# Patient Record
Sex: FEMALE | Race: ASIAN | NOT HISPANIC OR LATINO | Employment: FULL TIME | URBAN - METROPOLITAN AREA
[De-identification: names, ages, dates, MRNs, and addresses within clinical notes are randomized per-mention and may not be internally consistent; named-entity substitution may affect disease eponyms.]

---

## 2017-01-23 ENCOUNTER — LAB REQUISITION (OUTPATIENT)
Dept: LAB | Facility: HOSPITAL | Age: 37
End: 2017-01-23
Payer: COMMERCIAL

## 2017-01-23 ENCOUNTER — ALLSCRIPTS OFFICE VISIT (OUTPATIENT)
Dept: OTHER | Facility: OTHER | Age: 37
End: 2017-01-23

## 2017-01-23 DIAGNOSIS — Z11.3 ENCOUNTER FOR SCREENING FOR INFECTIONS WITH PREDOMINANTLY SEXUAL MODE OF TRANSMISSION: ICD-10-CM

## 2017-01-23 DIAGNOSIS — Z12.9 ENCOUNTER FOR SCREENING FOR MALIGNANT NEOPLASM: ICD-10-CM

## 2017-01-23 DIAGNOSIS — Z13.220 ENCOUNTER FOR SCREENING FOR LIPOID DISORDERS: ICD-10-CM

## 2017-01-23 PROCEDURE — 87591 N.GONORRHOEAE DNA AMP PROB: CPT | Performed by: FAMILY MEDICINE

## 2017-01-23 PROCEDURE — 87147 CULTURE TYPE IMMUNOLOGIC: CPT | Performed by: FAMILY MEDICINE

## 2017-01-23 PROCEDURE — 87491 CHLMYD TRACH DNA AMP PROBE: CPT

## 2017-01-23 PROCEDURE — 87070 CULTURE OTHR SPECIMN AEROBIC: CPT | Performed by: FAMILY MEDICINE

## 2017-01-23 PROCEDURE — G0145 SCR C/V CYTO,THINLAYER,RESCR: HCPCS | Performed by: FAMILY MEDICINE

## 2017-01-23 PROCEDURE — 87624 HPV HI-RISK TYP POOLED RSLT: CPT | Performed by: FAMILY MEDICINE

## 2017-01-24 DIAGNOSIS — Z13.220 ENCOUNTER FOR SCREENING FOR LIPOID DISORDERS: Primary | ICD-10-CM

## 2017-01-26 LAB
HPV RRNA GENITAL QL NAA+PROBE: NORMAL
LAB AP GYN PRIMARY INTERPRETATION: NORMAL
Lab: NORMAL

## 2017-01-27 LAB
BACTERIA GENITAL AEROBE CULT: NORMAL

## 2017-01-31 LAB — MISCELLANEOUS LAB TEST RESULT: NORMAL

## 2017-02-02 ENCOUNTER — GENERIC CONVERSION - ENCOUNTER (OUTPATIENT)
Dept: OTHER | Facility: OTHER | Age: 37
End: 2017-02-02

## 2018-01-13 VITALS
SYSTOLIC BLOOD PRESSURE: 100 MMHG | HEART RATE: 74 BPM | HEIGHT: 64 IN | RESPIRATION RATE: 18 BRPM | BODY MASS INDEX: 33.47 KG/M2 | OXYGEN SATURATION: 95 % | WEIGHT: 196.03 LBS | DIASTOLIC BLOOD PRESSURE: 60 MMHG

## 2018-01-13 NOTE — RESULT NOTES
Message   Called the patient but she did not   Left voicemail saying that her Pap smear is normal and genital culture showed growing normal vaginal kyler  Verified Results  (1) GENITAL COMPREHENSIVE CULTURE 23Jan2017 12:00PM Mario Jailyn     Test Name Result Flag Reference   CLINICAL REPORT (Report)     Test:        Genital Comprehensive Culture  Specimen Source:  Vaginal  Specimen Type:   Genital  Specimen Date:   1/23/2017 12:00 PM  Result Date:    1/27/2017 1:46 PM  Result Status:   Final result  Resulting Lab:   [place] [address]            Tel: [phone]      CULTURE                                       ------------------                                   1+ Growth of Beta Hemolytic Streptococcus Group B     *** This organism is intrinisically susceptible to Penicillin  If sensitivites to other antibiotics are required, please call the      Microbiology Department at [phone] within 5 days   ***    3+ Growth of Gardnerella vaginalis    3+ Growth of Mixed Vaginal Kyler     (1) SPECIAL TEST 23Jan2017 08:13AM Peter Cali     Test Name Result Flag Reference   TEST RESULT      SEE WRITTEN REPORT FROM [facility]

## 2018-02-15 ENCOUNTER — OFFICE VISIT (OUTPATIENT)
Dept: FAMILY MEDICINE CLINIC | Facility: CLINIC | Age: 38
End: 2018-02-15
Payer: COMMERCIAL

## 2018-02-15 VITALS
TEMPERATURE: 99.1 F | DIASTOLIC BLOOD PRESSURE: 86 MMHG | BODY MASS INDEX: 35.36 KG/M2 | WEIGHT: 207.13 LBS | OXYGEN SATURATION: 98 % | HEART RATE: 91 BPM | SYSTOLIC BLOOD PRESSURE: 124 MMHG | HEIGHT: 64 IN | RESPIRATION RATE: 18 BRPM

## 2018-02-15 DIAGNOSIS — Z12.39 ENCOUNTER FOR SCREENING FOR MALIGNANT NEOPLASM OF BREAST: ICD-10-CM

## 2018-02-15 DIAGNOSIS — Z80.3 FH: BREAST CANCER IN FIRST DEGREE RELATIVE: Primary | ICD-10-CM

## 2018-02-15 DIAGNOSIS — Z20.1 EXPOSURE TO TB: ICD-10-CM

## 2018-02-15 PROCEDURE — 99213 OFFICE O/P EST LOW 20 MIN: CPT | Performed by: FAMILY MEDICINE

## 2018-02-15 PROCEDURE — 3008F BODY MASS INDEX DOCD: CPT | Performed by: FAMILY MEDICINE

## 2018-02-15 NOTE — PROGRESS NOTES
Assessment/Plan:    Will get quantiferon gold test  And order mammo  Instructions  Monitor for sx     Diagnoses and all orders for this visit:    FH: breast cancer in first degree relative  -     Mammo screening bilateral w 3d & cad; Future    Exposure to TB  -     Quantiferon TB Gold; Future          Subjective:      Patient ID: Camden Daigle is a 44 y o  female  Patient was exposed to active tb  Over at least 8 weeks ago  Was rec to get quantiferon test  She has no sx  Otherwise healthy needs mammo as mother recently dx d with breast ca        The following portions of the patient's history were reviewed and updated as appropriate: past family history, past medical history, past social history and past surgical history  Review of Systems   Constitutional: Negative  HENT: Negative  Respiratory:        See hpi   Cardiovascular: Negative  Gastrointestinal: Negative  Musculoskeletal: Negative  Skin: Negative  Psychiatric/Behavioral: Negative  Objective:    Vitals:    02/15/18 0915   BP: 124/86   Pulse: 91   Resp: 18   Temp: 99 1 °F (37 3 °C)   SpO2: 98%        Physical Exam   Constitutional: She is oriented to person, place, and time  She appears well-developed  HENT:   Head: Normocephalic and atraumatic  Eyes: Pupils are equal, round, and reactive to light  Neck: Normal range of motion  Cardiovascular: Normal rate, regular rhythm and normal heart sounds  Pulmonary/Chest: Effort normal and breath sounds normal    Abdominal: Soft  Neurological: She is alert and oriented to person, place, and time  Skin: Skin is warm  Psychiatric: She has a normal mood and affect   Her behavior is normal

## 2018-02-17 LAB
ANNOTATION COMMENT IMP: NORMAL
GAMMA INTERFERON BACKGROUND BLD IA-ACNC: 0.02 IU/ML
M TB IFN-G BLD-IMP: NEGATIVE
M TB IFN-G CD4+ BCKGRND COR BLD-ACNC: 0 IU/ML
M TB IFN-G CD4+ T-CELLS BLD-ACNC: 0.02 IU/ML
MITOGEN IGNF BLD-ACNC: 5.45 IU/ML
QUANTIFERON-TB GOLD IN TUBE: NORMAL
SERVICE CMNT-IMP: NORMAL

## 2018-02-21 ENCOUNTER — HOSPITAL ENCOUNTER (OUTPATIENT)
Dept: RADIOLOGY | Facility: HOSPITAL | Age: 38
Discharge: HOME | End: 2018-02-21
Attending: FAMILY MEDICINE
Payer: COMMERCIAL

## 2018-02-21 DIAGNOSIS — Z80.3 FH: BREAST CANCER IN FIRST DEGREE RELATIVE: ICD-10-CM

## 2018-02-21 PROCEDURE — 77067 SCR MAMMO BI INCL CAD: CPT

## 2018-02-21 PROCEDURE — 77063 BREAST TOMOSYNTHESIS BI: CPT

## 2018-02-28 ENCOUNTER — HOSPITAL ENCOUNTER (OUTPATIENT)
Dept: RADIOLOGY | Facility: HOSPITAL | Age: 38
Discharge: HOME | End: 2018-02-28
Attending: FAMILY MEDICINE
Payer: COMMERCIAL

## 2018-02-28 ENCOUNTER — TRANSCRIBE ORDERS (OUTPATIENT)
Dept: ADMINISTRATIVE | Facility: HOSPITAL | Age: 38
End: 2018-02-28

## 2018-02-28 DIAGNOSIS — R92.8 ABNORMAL SCREENING MAMMOGRAM: ICD-10-CM

## 2018-02-28 PROCEDURE — G0279 TOMOSYNTHESIS, MAMMO: HCPCS

## 2018-02-28 PROCEDURE — 76642 ULTRASOUND BREAST LIMITED: CPT

## 2018-02-28 PROCEDURE — 77065 DX MAMMO INCL CAD UNI: CPT

## 2018-07-20 ENCOUNTER — OFFICE VISIT (OUTPATIENT)
Dept: FAMILY MEDICINE CLINIC | Facility: CLINIC | Age: 38
End: 2018-07-20
Payer: COMMERCIAL

## 2018-07-20 VITALS
RESPIRATION RATE: 18 BRPM | BODY MASS INDEX: 35.53 KG/M2 | SYSTOLIC BLOOD PRESSURE: 130 MMHG | DIASTOLIC BLOOD PRESSURE: 80 MMHG | HEART RATE: 79 BPM | WEIGHT: 207 LBS | OXYGEN SATURATION: 98 %

## 2018-07-20 DIAGNOSIS — D22.30 CHANGE IN FACIAL MOLE: Primary | ICD-10-CM

## 2018-07-20 PROCEDURE — 99214 OFFICE O/P EST MOD 30 MIN: CPT | Performed by: FAMILY MEDICINE

## 2018-07-20 NOTE — ASSESSMENT & PLAN NOTE
Due to the location of the mole on the face I ordered a Referral to Dermatology, and requisition provided to patient

## 2018-07-20 NOTE — PROGRESS NOTES
Assessment/Plan:    Change in facial mole  Due to the location of the mole on the face I ordered a Referral to Dermatology, and requisition provided to patient  Subjective:      Patient ID: René Brooks is a 43 y o  female  26-year-old female who presents with concerns of change in mole on her face  Will has been present since the age of 11  Patient also has concerns of other lesions on the face that she feels have since changed  Denies any history of melanoma or skin cancer  Reports mother had basal cell carcinoma after the age of 61  Denies any other symptoms at this time  The following portions of the patient's history were reviewed and updated as appropriate: allergies, current medications, past family history, past medical history, past social history, past surgical history and problem list     Review of Systems   Constitutional: Negative for chills and fever  Eyes: Negative for visual disturbance  Respiratory: Negative for shortness of breath  Gastrointestinal: Negative for abdominal pain, diarrhea and nausea  Neurological: Negative for dizziness, weakness and light-headedness  Objective:      /80   Pulse 79   Resp 18   Wt 93 9 kg (207 lb)   SpO2 98%   BMI 35 53 kg/m²          Physical Exam   Constitutional: She appears well-developed and well-nourished  No distress  HENT:   Head: Normocephalic and atraumatic    1 millimeter x 1 millimeter raised erythematous papular lesion located on left maxillary sinus area   Neck: No thyromegaly present  Cardiovascular: Normal rate and regular rhythm  Pulmonary/Chest: Effort normal and breath sounds normal    Musculoskeletal: She exhibits no edema  Lymphadenopathy:     She has no cervical adenopathy  Skin: She is not diaphoretic  Psychiatric: She has a normal mood and affect   Her behavior is normal

## 2019-08-14 ENCOUNTER — OFFICE VISIT (OUTPATIENT)
Dept: FAMILY MEDICINE CLINIC | Facility: CLINIC | Age: 39
End: 2019-08-14
Payer: COMMERCIAL

## 2019-08-14 VITALS
SYSTOLIC BLOOD PRESSURE: 108 MMHG | OXYGEN SATURATION: 97 % | WEIGHT: 195.8 LBS | BODY MASS INDEX: 33.43 KG/M2 | TEMPERATURE: 97.9 F | HEIGHT: 64 IN | HEART RATE: 72 BPM | DIASTOLIC BLOOD PRESSURE: 80 MMHG

## 2019-08-14 DIAGNOSIS — Z80.3 FH: BREAST CANCER IN FIRST DEGREE RELATIVE: ICD-10-CM

## 2019-08-14 DIAGNOSIS — Z29.8 NEED FOR MALARIA PROPHYLAXIS: ICD-10-CM

## 2019-08-14 DIAGNOSIS — Z20.2 POSSIBLE EXPOSURE TO STD: ICD-10-CM

## 2019-08-14 DIAGNOSIS — Z00.01 ENCOUNTER FOR GENERAL ADULT MEDICAL EXAMINATION WITH ABNORMAL FINDINGS: Primary | ICD-10-CM

## 2019-08-14 DIAGNOSIS — E66.09 CLASS 1 OBESITY DUE TO EXCESS CALORIES WITHOUT SERIOUS COMORBIDITY WITH BODY MASS INDEX (BMI) OF 33.0 TO 33.9 IN ADULT: ICD-10-CM

## 2019-08-14 DIAGNOSIS — Z23 ENCOUNTER FOR VACCINATION: ICD-10-CM

## 2019-08-14 DIAGNOSIS — Z12.39 ENCOUNTER FOR SCREENING FOR MALIGNANT NEOPLASM OF BREAST: ICD-10-CM

## 2019-08-14 PROBLEM — E66.811 CLASS 1 OBESITY DUE TO EXCESS CALORIES WITHOUT SERIOUS COMORBIDITY IN ADULT: Status: ACTIVE | Noted: 2019-08-14

## 2019-08-14 PROCEDURE — 99396 PREV VISIT EST AGE 40-64: CPT | Performed by: FAMILY MEDICINE

## 2019-08-14 PROCEDURE — 90471 IMMUNIZATION ADMIN: CPT | Performed by: FAMILY MEDICINE

## 2019-08-14 PROCEDURE — 90632 HEPA VACCINE ADULT IM: CPT | Performed by: FAMILY MEDICINE

## 2019-08-14 RX ORDER — MEFLOQUINE HYDROCHLORIDE 250 MG/1
250 TABLET ORAL
Qty: 4 TABLET | Refills: 4 | Status: SHIPPED | OUTPATIENT
Start: 2019-08-14 | End: 2019-11-11

## 2019-08-14 NOTE — PROGRESS NOTES
[facility] HEALTH MAINTENANCE OFFICE VISIT  [facility] - [facility]    NAME: René Brooks  AGE: 35 y o  SEX: female  : 1980     DATE: 8/15/2019    Assessment and Plan     Problem List Items Addressed This Visit        Other    FH: breast cancer in first degree relative    Relevant Orders    Mammo screening bilateral w 3d & cad    Encounter for screening for malignant neoplasm of breast    Class 1 obesity due to excess calories without serious comorbidity in adult    Relevant Orders    Comprehensive metabolic panel    Lipid Panel with Direct LDL reflex    HEMOGLOBIN A1C W/ EAG ESTIMATION    Possible exposure to STD    Relevant Orders    HIV 1/2 AG-AB combo    Hepatitis C antibody    Chlamydia/GC amplified DNA by PCR    Encounter for general adult medical examination with abnormal findings - Primary      Other Visit Diagnoses     Need for malaria prophylaxis        Relevant Medications    mefloquine (LARIAM) 250 MG tablet    Encounter for vaccination        Relevant Orders    HEPATITIS A VACCINE ADULT IM (Completed)            · Patient Counseling:   · Nutrition: Stressed importance of a well balanced diet, moderation of sodium/saturated fat, caloric balance and sufficient intake of fiber  · Exercise: Stressed the importance of regular exercise with a goal of 150 minutes per week  · Dental Health: Discussed daily flossing and brushing and regular dental visits     · Immunizations reviewed, will need polio and hep a vaccinations secondary to traveling to [place] in near-future   · Discussed benefits of screening cervical and breast cancer   BMI Counseling: Body mass index is 33 61 kg/m²  Discussed with patient's BMI with her  The BMI is above average  BMI counseling and education was provided to the patient  Nutrition recommendations include reducing portion sizes, decreasing overall calorie intake, 3-5 servings of fruits/vegetables daily and reducing fast food intake  No follow-ups on file  Chief Complaint     Chief Complaint   Patient presents with    Physical Exam     questions about vaccines- traveling out of country       History of Present Illness     HPI    Well Adult Physical   Patient here for a comprehensive physical exam       Diet and Physical Activity  Diet: well balanced diet  Exercise: daily      Depression Screen  PHQ-9 Depression Screening    PHQ-9:    Frequency of the following problems over the past two weeks:               General Health  Hearing: Normal:  bilateral  Vision: no vision problems  Dental: regular dental visits    Reproductive Health  Regular monthly menstrual cycles, not currently sexually active  Requests STD testing  Last Pap normal in 2017  Due for mammogram this year  The following portions of the patient's history were reviewed and updated as appropriate: allergies, current medications, past family history, past medical history, past social history, past surgical history and problem list     Review of Systems     Review of Systems   Constitutional: Negative for chills, fatigue and fever  HENT: Negative for congestion, rhinorrhea and sore throat  Eyes: Negative for visual disturbance  Respiratory: Negative for shortness of breath and wheezing  Cardiovascular: Negative for chest pain and palpitations  Gastrointestinal: Negative for abdominal pain, diarrhea, nausea and vomiting  Genitourinary: Negative for dysuria  Musculoskeletal: Negative for arthralgias  Neurological: Negative for dizziness, weakness and light-headedness  Psychiatric/Behavioral: Negative for suicidal ideas         Past Medical History     Past Medical History:   Diagnosis Date    Asthma     Urinary tract infection        Past Surgical History     Past Surgical History:   Procedure Laterality Date    MULTIPLE TOOTH EXTRACTIONS         Social History     Social History     Socioeconomic History    Marital status:      Spouse name: None    Number of children: None    Years of education: None    Highest education level: None   Occupational History    None   Social Needs    Financial resource strain: None    Food insecurity:     Worry: None     Inability: None    Transportation needs:     Medical: None     Non-medical: None   Tobacco Use    Smoking status: Never Smoker    Smokeless tobacco: Never Used   Substance and Sexual Activity    Alcohol use: No    Drug use: None    Sexual activity: None   Lifestyle    Physical activity:     Days per week: None     Minutes per session: None    Stress: None   Relationships    Social connections:     Talks on phone: None     Gets together: None     Attends Nondenominational service: None     Active member of club or organization: None     Attends meetings of clubs or organizations: None     Relationship status: None    Intimate partner violence:     Fear of current or ex partner: None     Emotionally abused: None     Physically abused: None     Forced sexual activity: None   Other Topics Concern    None   Social History Narrative    None       Family History     Family History   Problem Relation Age of Onset    Diabetes Father     Diabetes Family     Hypertension Family        Current Medications       Current Outpatient Medications:     mefloquine (LARIAM) 250 MG tablet, Take 1 tablet (250 mg total) by mouth every 7 days for 7 doses, Disp: 4 tablet, Rfl: 4     Allergies     No Known Allergies    Objective     /80 (BP Location: Left arm, Patient Position: Sitting, Cuff Size: Large)   Pulse 72   Temp 97 9 °F (36 6 °C) (Tympanic)   Ht 5' 4" (1 626 m)   Wt 88 8 kg (195 lb 12 8 oz)   SpO2 97%   BMI 33 61 kg/m²      Physical Exam   Constitutional: She is oriented to person, place, and time  She appears well-developed and well-nourished  No distress  HENT:   Head: Normocephalic and atraumatic     Right Ear: External ear normal    Left Ear: External ear normal    Nose: Nose normal  Mouth/Throat: Oropharynx is clear and moist  No oropharyngeal exudate  Eyes: Pupils are equal, round, and reactive to light  Conjunctivae and EOM are normal  Right eye exhibits no discharge  Left eye exhibits no discharge  No scleral icterus  Neck: Normal range of motion  Neck supple  No JVD present  No thyromegaly present  Cardiovascular: Normal rate, regular rhythm, normal heart sounds and intact distal pulses  Exam reveals no gallop and no friction rub  No murmur heard  Pulmonary/Chest: Effort normal and breath sounds normal  No respiratory distress  She has no wheezes  She has no rales  Abdominal: Soft  Bowel sounds are normal  She exhibits no distension  There is no tenderness  There is no rebound and no guarding  Musculoskeletal: She exhibits no edema  Lymphadenopathy:     She has no cervical adenopathy  Neurological: She is alert and oriented to person, place, and time  Skin: Skin is warm and dry  No rash noted  She is not diaphoretic  No pallor  Psychiatric: She has a normal mood and affect   Her behavior is normal          No exam data present        Lindsey Moya MD  [address]

## 2019-08-15 PROBLEM — Z00.01 ENCOUNTER FOR GENERAL ADULT MEDICAL EXAMINATION WITH ABNORMAL FINDINGS: Status: ACTIVE | Noted: 2019-08-15

## 2019-08-15 LAB
C TRACH RRNA SPEC QL NAA+PROBE: NEGATIVE
N GONORRHOEA RRNA SPEC QL NAA+PROBE: NEGATIVE

## 2019-08-19 ENCOUNTER — CLINICAL SUPPORT (OUTPATIENT)
Dept: FAMILY MEDICINE CLINIC | Facility: CLINIC | Age: 39
End: 2019-08-19
Payer: COMMERCIAL

## 2019-08-19 DIAGNOSIS — Z23 ENCOUNTER FOR IMMUNIZATION: Primary | ICD-10-CM

## 2019-08-19 PROCEDURE — 90471 IMMUNIZATION ADMIN: CPT | Performed by: FAMILY MEDICINE

## 2019-08-19 PROCEDURE — 90713 POLIOVIRUS IPV SC/IM: CPT | Performed by: FAMILY MEDICINE

## 2019-08-23 LAB
ALBUMIN SERPL-MCNC: 4.5 G/DL (ref 3.5–5.5)
ALBUMIN/GLOB SERPL: 1.5 {RATIO} (ref 1.2–2.2)
ALP SERPL-CCNC: 49 IU/L (ref 39–117)
ALT SERPL-CCNC: 15 IU/L (ref 0–32)
AST SERPL-CCNC: 17 IU/L (ref 0–40)
BILIRUB SERPL-MCNC: 0.5 MG/DL (ref 0–1.2)
BUN SERPL-MCNC: 12 MG/DL (ref 6–24)
BUN/CREAT SERPL: 13 (ref 9–23)
CALCIUM SERPL-MCNC: 9.1 MG/DL (ref 8.7–10.2)
CHLORIDE SERPL-SCNC: 102 MMOL/L (ref 96–106)
CHOLEST SERPL-MCNC: 192 MG/DL (ref 100–199)
CO2 SERPL-SCNC: 18 MMOL/L (ref 20–29)
CREAT SERPL-MCNC: 0.89 MG/DL (ref 0.57–1)
EST. AVERAGE GLUCOSE BLD GHB EST-MCNC: 105 MG/DL
GLOBULIN SER-MCNC: 3 G/DL (ref 1.5–4.5)
GLUCOSE SERPL-MCNC: 99 MG/DL (ref 65–99)
HBA1C MFR BLD: 5.3 % (ref 4.8–5.6)
HCV AB S/CO SERPL IA: <0.1 S/CO RATIO (ref 0–0.9)
HDLC SERPL-MCNC: 51 MG/DL
HIV 1+2 AB+HIV1 P24 AG SERPL QL IA: NON REACTIVE
LDLC SERPL CALC-MCNC: 114 MG/DL (ref 0–99)
POTASSIUM SERPL-SCNC: 4 MMOL/L (ref 3.5–5.2)
PROT SERPL-MCNC: 7.5 G/DL (ref 6–8.5)
SL AMB EGFR AFRICAN AMERICAN: 94 ML/MIN/1.73
SL AMB EGFR NON AFRICAN AMERICAN: 81 ML/MIN/1.73
SODIUM SERPL-SCNC: 139 MMOL/L (ref 134–144)
TRIGL SERPL-MCNC: 137 MG/DL (ref 0–149)

## 2019-11-11 ENCOUNTER — OFFICE VISIT (OUTPATIENT)
Dept: FAMILY MEDICINE CLINIC | Facility: CLINIC | Age: 39
End: 2019-11-11
Payer: COMMERCIAL

## 2019-11-11 VITALS
WEIGHT: 196 LBS | BODY MASS INDEX: 33.46 KG/M2 | HEIGHT: 64 IN | SYSTOLIC BLOOD PRESSURE: 102 MMHG | TEMPERATURE: 97.7 F | HEART RATE: 93 BPM | OXYGEN SATURATION: 99 % | DIASTOLIC BLOOD PRESSURE: 60 MMHG

## 2019-11-11 DIAGNOSIS — Z29.8 NEED FOR MALARIA PROPHYLAXIS: Primary | ICD-10-CM

## 2019-11-11 DIAGNOSIS — Z23 ENCOUNTER FOR IMMUNIZATION: ICD-10-CM

## 2019-11-11 PROCEDURE — 90472 IMMUNIZATION ADMIN EACH ADD: CPT | Performed by: FAMILY MEDICINE

## 2019-11-11 PROCEDURE — 90715 TDAP VACCINE 7 YRS/> IM: CPT | Performed by: FAMILY MEDICINE

## 2019-11-11 PROCEDURE — 90471 IMMUNIZATION ADMIN: CPT | Performed by: FAMILY MEDICINE

## 2019-11-11 PROCEDURE — 99213 OFFICE O/P EST LOW 20 MIN: CPT | Performed by: FAMILY MEDICINE

## 2019-11-11 PROCEDURE — 90734 MENACWYD/MENACWYCRM VACC IM: CPT | Performed by: FAMILY MEDICINE

## 2019-11-11 RX ORDER — ATOVAQUONE AND PROGUANIL HYDROCHLORIDE 250; 100 MG/1; MG/1
TABLET, FILM COATED ORAL
Qty: 21 TABLET | Refills: 0 | Status: SHIPPED | OUTPATIENT
Start: 2019-11-11 | End: 2021-03-11

## 2019-11-11 NOTE — LETTER
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

## 2019-11-11 NOTE — PROGRESS NOTES
Assessment/Plan:  Administered Tdap and Menactra vaccinations today  Also prescribed Malarone for malaria prophylaxis  Infectious Disease physician previously administered yellow fever vaccine and given oral medication for typhoid  Patient previously received influenza vaccine  Diagnoses and all orders for this visit:    Need for malaria prophylaxis  -     atovaquone-proguanil (MALARONE) 250-100 mg; Take 1 tablet daily 2 days prior to travel, then take 1 tablet daily while in [place], then take 1 tablet daily for 7 days after return  Encounter for immunization  -     Tdap vaccine greater than or equal to 6yo IM  -     MENINGOCOCCAL CONJUGATE VACCINE MCV4P IM          Subjective:      Patient ID: Steven Plummer is a 35 y o  female  50-year-old female with no significant past medical history presents for immunizations and prophylaxis prior to trip to [facility] in March  Patient was seen previously in given hepatitis a vaccine along with polio  She is also prescribed Lariam for malaria prophylaxis, however due to potential psychiatric side effect requested switching to a different medication  She was then seen by infectious disease who administered yellow fever vaccination along with typhoid prophylaxis pills  She is also given azithromycin for traveler's diarrhea  Today she presents for Menactra along with Tdap vaccinations  She reports no complaints today  Denies any fevers, chills, cough, chest pain, shortness of breath, diarrhea or vomiting  She has no prior history of communicable diseases  The following portions of the patient's history were reviewed and updated as appropriate: allergies, current medications, past family history, past medical history, past social history, past surgical history and problem list     Review of Systems   Constitutional: Negative for chills, fatigue and fever  HENT: Negative for congestion, rhinorrhea and sore throat      Eyes: Negative for visual disturbance  Respiratory: Negative for shortness of breath and wheezing  Cardiovascular: Negative for chest pain and palpitations  Gastrointestinal: Negative for abdominal pain, diarrhea, nausea and vomiting  Genitourinary: Negative for dysuria  Musculoskeletal: Negative for arthralgias  Neurological: Negative for dizziness, weakness and light-headedness  Psychiatric/Behavioral: Negative for suicidal ideas  Objective:      /60   Pulse 93   Temp 97 7 °F (36 5 °C)   Ht 5' 4" (1 626 m)   Wt 88 9 kg (196 lb)   SpO2 99%   BMI 33 64 kg/m²          Physical Exam   Constitutional: She is oriented to person, place, and time  She appears well-developed and well-nourished  No distress  HENT:   Head: Normocephalic and atraumatic  Right Ear: External ear normal    Left Ear: External ear normal    Nose: Nose normal    Mouth/Throat: Oropharynx is clear and moist  No oropharyngeal exudate  Eyes: Pupils are equal, round, and reactive to light  Conjunctivae and EOM are normal  Right eye exhibits no discharge  Left eye exhibits no discharge  No scleral icterus  Neck: Normal range of motion  Neck supple  No JVD present  No thyromegaly present  Cardiovascular: Normal rate, regular rhythm, normal heart sounds and intact distal pulses  Exam reveals no gallop and no friction rub  No murmur heard  Pulmonary/Chest: Effort normal and breath sounds normal  No respiratory distress  She has no wheezes  She has no rales  Abdominal: Soft  Bowel sounds are normal  She exhibits no distension  There is no tenderness  There is no rebound and no guarding  Musculoskeletal: She exhibits no edema  Lymphadenopathy:     She has no cervical adenopathy  Neurological: She is alert and oriented to person, place, and time  Skin: Skin is warm and dry  No rash noted  She is not diaphoretic  No pallor  Psychiatric: She has a normal mood and affect   Her behavior is normal

## 2020-11-06 ENCOUNTER — IMMUNIZATIONS (OUTPATIENT)
Dept: FAMILY MEDICINE CLINIC | Facility: CLINIC | Age: 40
End: 2020-11-06
Payer: COMMERCIAL

## 2020-11-06 DIAGNOSIS — Z23 ENCOUNTER FOR IMMUNIZATION: ICD-10-CM

## 2020-11-06 PROCEDURE — 90682 RIV4 VACC RECOMBINANT DNA IM: CPT

## 2020-11-06 PROCEDURE — 90471 IMMUNIZATION ADMIN: CPT

## 2020-12-31 ENCOUNTER — TELEMEDICINE (OUTPATIENT)
Dept: FAMILY MEDICINE CLINIC | Facility: CLINIC | Age: 40
End: 2020-12-31
Payer: COMMERCIAL

## 2020-12-31 DIAGNOSIS — Z20.822 EXPOSURE TO COVID-19 VIRUS: Primary | ICD-10-CM

## 2020-12-31 PROCEDURE — 99442 PR PHYS/QHP TELEPHONE EVALUATION 11-20 MIN: CPT | Performed by: FAMILY MEDICINE

## 2021-03-03 ENCOUNTER — TELEPHONE (OUTPATIENT)
Dept: FAMILY MEDICINE CLINIC | Facility: CLINIC | Age: 41
End: 2021-03-03

## 2021-03-03 NOTE — TELEPHONE ENCOUNTER
Patient would like a call back to discuss getting Labs for Antibody Test  Patient can be reached at the number listed below;    [phone] Cell

## 2021-03-11 ENCOUNTER — TELEPHONE (OUTPATIENT)
Dept: FAMILY MEDICINE CLINIC | Facility: CLINIC | Age: 41
End: 2021-03-11

## 2021-03-11 ENCOUNTER — TELEMEDICINE (OUTPATIENT)
Dept: FAMILY MEDICINE CLINIC | Facility: CLINIC | Age: 41
End: 2021-03-11
Payer: COMMERCIAL

## 2021-03-11 DIAGNOSIS — J45.20 MILD INTERMITTENT ASTHMA WITHOUT COMPLICATION: ICD-10-CM

## 2021-03-11 DIAGNOSIS — S93.401A SPRAIN OF RIGHT ANKLE, UNSPECIFIED LIGAMENT, INITIAL ENCOUNTER: Primary | ICD-10-CM

## 2021-03-11 DIAGNOSIS — Z30.09 GENERAL COUNSELLING AND ADVICE ON CONTRACEPTION: ICD-10-CM

## 2021-03-11 PROCEDURE — 99213 OFFICE O/P EST LOW 20 MIN: CPT | Performed by: FAMILY MEDICINE

## 2021-03-11 RX ORDER — ALBUTEROL SULFATE 90 UG/1
2 AEROSOL, METERED RESPIRATORY (INHALATION) EVERY 6 HOURS PRN
Qty: 1 INHALER | Refills: 5 | Status: SHIPPED | OUTPATIENT
Start: 2021-03-11

## 2021-03-11 NOTE — TELEPHONE ENCOUNTER
Unable to reach patient for schedule virtual visit this morning    Left message on her voicemail,   Will attempt to call back in a few minutes  Soni Templeton, DO

## 2021-03-11 NOTE — PATIENT INSTRUCTIONS
Intrauterine Device   AMBULATORY CARE:   An IUD  is a type of birth control that is inserted into your uterus  It is a small, flexible piece of plastic with a string on the end  It is inserted and removed by your healthcare provider  IUDs prevent sperm from reaching or fertilizing an egg  IUDs also prevent a fertilized egg from attaching to the uterus and developing into a fetus  Common types of IUDs:  Your healthcare provider will recommend the type of IUD that is right for you  This is based on your age and if you have had a child  If you have not had a child, a smaller IUD will be used  · A copper IUD  slowly releases a small amount of copper into your uterus  This IUD can remain in place for up to 10 years  · A hormone-releasing IUD  slowly releases a small amount of progesterone into your uterus  Progesterone is a hormone that is made by your body to help control your periods  This IUD can remain in place for 3 to 5 years  Seek care immediately if:   · You have severe pain or bleeding during your period  · You have a fever and severe abdominal pain  Call your doctor or gynecologist if:   · You think you are pregnant  · The IUD has come out  · You have bleeding from your vagina after you have sex, and it is not your period  · You have pain during sex  · You cannot feel the IUD string, the string feels longer, or you feel the plastic of the IUD itself  · You have vaginal discharge that is green, yellow, or has a foul odor  · You have questions or concerns about your condition or care  Advantages of an IUD:   · An IUD is 98% to 99% effective in preventing pregnancy  · The IUD can be removed by your healthcare provider if you decide to have a baby  You may be able to get pregnant as soon as the IUD is removed  · An IUD protects you from pregnancy right after it is inserted  · You do not have to stop sexual activity to insert it   You do not have to remember to take your birth control pill  · Copper IUDs are safer for some women than oral birth control pills  Examples include women who smoke or have a history of blood clots  · Hormone-releasing IUDs may decrease certain health problems  Examples include bleeding and cramping that happen with your monthly period  Disadvantages of an IUD:   · There is a small chance that you could get pregnant  Sometimes the IUD cannot be removed after you get pregnant  This increases your risk of a miscarriage or an ectopic pregnancy  Ectopic pregnancy is when the fertilized egg starts to grow somewhere other than your uterus  · An IUD does not protect you from sexually transmitted infections  · You may have cramps during the first weeks after you get the IUD  · A copper IUD may cause your monthly period to be heavier or more painful  This is more common within the first 3 months after you get the IUD  You may need to have your IUD removed if your bleeding or pain becomes severe  You may have spotting between periods  · There is a small risk of an infection within the first 20 days after the IUD is placed  Infection can lead to pelvic inflammatory disease  This can cause infertility  · Your uterus may tear when the IUD is inserted  The IUD may slip part or all of the way out of your uterus  Self-care:   · NSAIDs , such as ibuprofen, help decrease swelling, pain, and fever  This medicine is available with or without a doctor's order  NSAIDs can cause stomach bleeding or kidney problems in certain people  If you take blood thinner medicine, always ask if NSAIDs are safe for you  Always read the medicine label and follow directions  · Apply heat to relieve pain and cramping  Use a heating pad set on low  Apply heat to your lower abdomen for 20 minutes every hour, or as directed  · Return to activities as directed    Your healthcare provider will tell you when it is okay to return to work, school, or other activities  · Do not use a tampon or have sex  until your provider says it is okay  Make sure your IUD is in place: An IUD has a string that is made of plastic thread  One to 2 inches of this string hangs into your vagina  You cannot see this string, and it should not cause problems when you have sex  Check your IUD string every 3 days for the first 3 months that you have your IUD  After that, check the string after each monthly period  Do the following to check the placement of your IUD:  · Wash your hands with soap and warm water  Dry them with a clean towel  · Bend your knees and squat low to the ground  · Gently put your index finger inside your vagina  The cervix is at the top of the vagina and feels like the tip of your nose  Feel for the IUD string  Do not pull on the string  You should not be able to feel the firm plastic of the IUD itself  · Wash your hands after you check your IUD string  For more information:   · [facility] [address] , [address]  Phone: [phone] - [phone]  Web Address: https://[employer]  org    Follow up with your healthcare provider as directed:  Write down your questions so you remember to ask them during your visits  © Copyright [address] Information is for End User's use only and may not be sold, redistributed or otherwise used for commercial purposes  All illustrations and images included in CareNotes® are the copyrighted property of A D A M , Inc  or [address] [place] [address]  The above information is an  only  It is not intended as medical advice for individual conditions or treatments  Talk to your doctor, nurse or pharmacist before following any medical regimen to see if it is safe and effective for you  Tubal Ligation   AMBULATORY CARE:   What you need to know about a tubal ligation:  A tubal ligation is surgery to close your fallopian tubes to prevent pregnancy     How to prepare for a tubal ligation:  Your healthcare provider will talk to you about how to prepare for surgery  He may tell you not to eat or drink anything within 8 hours before your surgery  He will tell you what medicines to take or not take on the day of your surgery  Arrange for someone to drive you home and stay with you after surgery  What will happen during a tubal ligation:   · You may be given general anesthesia to keep you asleep and free from pain during surgery  You may instead be given regional anesthesia or local anesthesia  Regional anesthesia numbs the lower part of your body  Local anesthesia numbs the surgery area  With local anesthesia, you may still feel pressure or pushing during surgery, but you should not feel any pain  You may have a minilaparotomy or laparoscopic tubal ligation  During a minilaparotomy, your surgeon will make a small incision in your lower abdomen  · During laparoscopic tubal ligation, your surgeon will make one or more small incisions in your abdomen  A laparoscope and other instruments will be put into your abdomen through the small incisions  The laparoscope is a long metal tube with a light and camera on the end  Your abdomen will be filled with a gas  This allows your surgeon to see inside your abdomen more clearly  Your fallopian tubes will be cut and closed with thread  Your healthcare provider may instead seal your tubes with heat, or with a clip or ring  After the surgery is done, your incisions are closed with stitches or staples  The incisions may be covered with a bandage  What will happen after a tubal ligation:  You will have abdominal pain and cramps for the first few days after surgery  You may feel dizzy, nauseated, bloated, or have gas  You may also have pain in your shoulders or near your ribs if gas was put in your abdomen  Risks of a tubal ligation:  You may bleed more than expected or get an infection   Blood vessels or organs such as your bowel or bladder could be injured during surgery  Although pregnancy is unlikely after a tubal ligation, there is still a small chance that you may get pregnant  If pregnancy does occur, there is an increased risk for an ectopic pregnancy (tubal pregnancy)  You may get a blood clot in your leg or arm  This may become life-threatening  Call 911 for any of the following:   · You cough up blood  · You feel lightheaded, short of breath, and have chest pain  Seek care immediately if:   · Your arm or leg feels warm, tender, and painful  It may look swollen and red  · You have severe abdominal pain  Contact your surgeon or gynecologist if:   · You have a fever  · You have heavy bleeding from your incisions  · Your stitches or staples come apart  · You have trouble urinating, burning when you urinate, or bloody urine  · Your incision is swollen, red, or has pus coming from it  · You have questions or concerns about your condition or care  Medicines: You may need any of the following:  · Prescription pain medicine  may be given  Ask how to take this medicine safely  · Acetaminophen  decreases pain  It is available without a doctor's order  Ask how much to take and how often to take it  Follow directions  Acetaminophen can cause liver damage if not taken correctly  · NSAIDs , such as ibuprofen, help decrease swelling, pain, and fever  NSAIDs can cause stomach bleeding or kidney problems in certain people  If you take blood thinner medicine, always ask your healthcare provider if NSAIDs are safe for you  Always read the medicine label and follow directions  · Take your medicine as directed  Contact your healthcare provider if you think your medicine is not helping or if you have side effects  Tell him or her if you are allergic to any medicine  Keep a list of the medicines, vitamins, and herbs you take  Include the amounts, and when and why you take them   Bring the list or the pill bottles to follow-up visits  Carry your medicine list with you in case of an emergency  Activity:   · Avoid strenuous activities  such as lifting heavy objects and intense exercise for 7 days after your surgery  Ask when it is safe for you to drive, return to work, and return to other regular activities  · Do not strain during bowel movements  High-fiber foods and extra liquids can help you prevent constipation  Examples of high-fiber foods are fruit and bran  Prune juice and water are good liquids to drink  · Do not have sex  for at least 1 week  If your tubal ligation surgery was done after you had a baby, you will need to wait longer  Ask your healthcare provider when you can have sex  · Do not go into a bath or hot tub  for 10 days or as directed  Take a shower instead of taking a bath  Wound care:  Care for your wound as directed  Carefully wash the wound with soap and water  Dry the area and put on new, clean bandages as directed  Change your bandages when they get wet or dirty  Follow up with your healthcare provider within 7 to 10 days or as directed:  Write down your questions so you remember to ask them during your visits  © Copyright [address] [place] [address] Drive Information is for End User's use only and may not be sold, redistributed or otherwise used for commercial purposes  All illustrations and images included in CareNotes® are the copyrighted property of A D A M , Inc  or [employer]  The above information is an  only  It is not intended as medical advice for individual conditions or treatments  Talk to your doctor, nurse or pharmacist before following any medical regimen to see if it is safe and effective for you

## 2021-03-11 NOTE — PROGRESS NOTES
Virtual Brief Visit    Assessment/Plan:  14-year-old female with past medical history of mild asthma, reported COVID-19 exposure on 01/01/2021 remain in symptomatic, ankle sprain present virtually to discuss multiple health concerns - doing well    Mild asthma  Albuterol inhaler ordered    Right ankle sprain  Diagnosis per HPI, instructed to purchase over-the-counter lace-up ankle boots, ambulatory referral to physical therapy ordered  May take Tylenol p r n  for pain, apply ice  Contraception counseling  Patient is interested in long-term contraception  IUD discussed in detail, patient is also considering bilateral tubal ligation  Overdue for mammogram   Patient informed that the mammogram order is still valid  Instructed to call central scheduling  Return to care with after for annual physical exam       Problem List Items Addressed This Visit     None      Visit Diagnoses     Sprain of right ankle, unspecified ligament, initial encounter    -  Primary    Relevant Orders    Ambulatory referral to Physical Therapy    Mild intermittent asthma without complication        Relevant Medications    albuterol (PROVENTIL HFA,VENTOLIN HFA) 90 mcg/act inhaler    General counselling and advice on contraception                    Reason for visit is   Chief Complaint   Patient presents with    Virtual Brief Visit        Encounter provider Patito Lim DO    Provider located at [address] [place] [id number]    Recent Visits  No visits were found meeting these conditions  Showing recent visits within past 7 days and meeting all other requirements     Today's Visits  Date Type Provider Dept   03/11/21 Telephone DO Shabbir Corbin   03/11/21 Telemedicine DO Shabbir Cadena   Showing today's visits and meeting all other requirements     Future Appointments  No visits were found meeting these conditions  Showing future appointments within next 150 days and meeting all other requirements        After connecting through [email or web address] and patient was informed that this is a secure, HIPAA-compliant platform  She agrees to proceed  , the patient was identified by name and date of birth  Ruy Yusuf was informed that this is a telemedicine visit and that the visit is being conducted through [employer] and patient was informed that this is a secure, HIPAA-compliant platform  She agrees to proceed     Other methods to assure confidentiality were taken earpods  The patient was notified the following individuals were present in the room residents  She acknowledged consent and understanding of privacy and security of the platform  The patient has agreed to participate and understands she can discontinue the visit at any time  Patient is aware this is a billable service  Subjective    Ruy Yusuf is a 42 y o  female 40-year-old female with past medical history of mild asthma, reported COVID-19 exposure on 01/01/2021 remain in symptomatic, ankle sprain present virtually to discuss multiple health concerns - doing well  Patient inquired about the need for antibody testing given that  She was exposed to her daughter who tested positive for COVID-19 on 01/01/2021  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  Ankle Pain   Incident onset: 3d ago  [place] park  The injury mechanism was an inversion injury (while jumping)  The pain is at a severity of 0/10  The patient is experiencing no pain  The pain has been improving since onset  Associated symptoms include a loss of motion (limited inward & outward motion)  Pertinent negatives include no inability to bear weight, loss of sensation, muscle weakness, numbness or tingling  Associated symptoms comments: Pain with weight bearing  She reports no foreign bodies present  The symptoms are aggravated by movement   She has tried ice and NSAIDs for the symptoms  The treatment provided significant relief  Past Medical History:   Diagnosis Date    Asthma     Urinary tract infection        Past Surgical History:   Procedure Laterality Date    MULTIPLE TOOTH EXTRACTIONS         No current outpatient medications on file  No current facility-administered medications for this visit  No Known Allergies    Review of Systems   Neurological: Negative for tingling and numbness  There were no vitals filed for this visit  I spent 15 minutes directly with the patient during this visit    VIRTUAL VISIT DISCLAIMER    Nia Granados acknowledges that she has consented to an online visit or consultation  She understands that the online visit is based solely on information provided by her, and that, in the absence of a face-to-face physical evaluation by the physician, the diagnosis she receives is both limited and provisional in terms of accuracy and completeness  This is not intended to replace a full medical face-to-face evaluation by the physician  Nia Granados understands and accepts these terms

## 2021-03-22 ENCOUNTER — EVALUATION (OUTPATIENT)
Dept: PHYSICAL THERAPY | Facility: CLINIC | Age: 41
End: 2021-03-22
Payer: COMMERCIAL

## 2021-03-22 DIAGNOSIS — S93.401D SPRAIN OF RIGHT ANKLE, UNSPECIFIED LIGAMENT, SUBSEQUENT ENCOUNTER: ICD-10-CM

## 2021-03-22 DIAGNOSIS — M25.571 ACUTE RIGHT ANKLE PAIN: Primary | ICD-10-CM

## 2021-03-22 PROCEDURE — 97161 PT EVAL LOW COMPLEX 20 MIN: CPT

## 2021-03-22 PROCEDURE — 97110 THERAPEUTIC EXERCISES: CPT

## 2021-03-22 NOTE — PROGRESS NOTES
PT Evaluation     Today's date: 3/22/2021  Patient name: Len Gutierrez  : 1980  MRN: [id number]  Referring provider: Uzma Arriaga DO  Dx:   Encounter Diagnosis     ICD-10-CM    1  Acute right ankle pain  M25 571    2  Sprain of right ankle, unspecified ligament, subsequent encounter  S93 401D Ambulatory referral to Physical Therapy       Start Time: 1230  Stop Time: 1315  Total time in clinic (min): 45 minutes    Assessment  Assessment details: Len Gutierrez is a 42y o  year old female reports to physical therapy with symptoms consistent with referring diagnosis of: Acute right ankle pain  (primary encounter diagnosis), Sprain of right ankle, unspecified ligament, subsequent encounter (Plan: Ambulatory referral to Physical Therapy)  Patient reports spraining her R ankle when she was playing with her daughter at the park two weeks ago  She presents to PT with edema of R ankle on the lateral malleolus  Ecchymosis of R lateral ankle and greater and lesser toes  Patient demonstrates limitations in R ankle ROM, strength, and functional mobility  Patient is limited in the following functional activities: ascending/descending stairs, walking up and down ramps, and walking greater than 15 minutes  FOTO score is 36% with a 70% prediction in function  Patient requires skilled physical therapy to restore prior level of function, address functional limitations, and progress towards independence with home exercise program  Patient was educated on HEP as noted on flow sheet, importance of performing ice for swelling, and elevation for swelling  Patient verbalized and demonstrated understanding of HEP and plan of care  Symptom irritability: low  Goals  STGs to be achieved in 4 weeks  -STG 1: Patient will be independent with HEP    -STG 2: Patient will have 0/10 R ankle pain at rest    -STG 3: Patient will increase R ankle ROM to within normal limits     -STG 4: Patient will report 40% functional improvement  -STG 5: Patient will demonstrate 1/2 grade MMT improvement in R ankle  -STG 6: Patient will be able to walk for greater than 15 minutes with no pain  -STG 7: Patient will perform 10 R heel raises with no pain  -STG 8: Figure 8 measurement of R ankle equal to that of the L ankle  LTGs to be achieved in 8 weeks  -LTG 1: Patient will demonstrate independence with maintenance program    -LTG 2: Patient will perform all functional activities with less than 2/10 R ankle pain  -LTG 3: Patient will improve FOTO score by 10 points  -LTG 4: Patient will report 80% functional improvement  -LTG 5: Patient will be able to walk for greater than 30 minutes with no pain  -LTG 6: Patient will demonstrate 1 grade MMT improvement in R ankle  -LTG 7: Patient will perform R single limb balance greater than 30 seconds  Plan  Patient would benefit from: PT eval and skilled physical therapy  Planned modality interventions: TENS, cryotherapy, electrical stimulation/Russian stimulation, thermotherapy: hydrocollator packs, traction and ultrasound  Planned therapy interventions: manual therapy, massage, Gonzales taping, joint mobilization, neuromuscular re-education, patient education, balance, strengthening, stretching, therapeutic activities, therapeutic exercise, flexibility, functional ROM exercises, gait training, graded activity, graded exercise, graded motor, therapeutic training and home exercise program  Frequency: 2x week  Duration in weeks: 8  Treatment plan discussed with: patient        Subjective Evaluation    History of Present Illness  Date of surgery: 3/7/2021  Mechanism of injury: trauma  Mechanism of injury: Patient states she rolled her ankle after playing with her child  She reports her ankle turned purple and swelled immediately  She was able to walk on her R ankle, but did have some discomfort   She felt as though the ankle felt great, but the "purple kept moving " She reports the last several days it has improved significantly, but her swelling is persistent  She reports being able to walk up and down the stairs with no difficulties  Functionally, she reports difficulties with walking greater than 15 minutes, walking up and down ramps, and ascending/descending stairs  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  She is nervous to walk around campus  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[place], [place]  Pain  Current pain ratin  At best pain ratin  At worst pain ratin  Quality: dull ache  Relieving factors: ice and medications  Aggravating factors: standing, lifting, stair climbing and walking    Social Support  Steps to enter house: yes  3  Stairs in house: yes   12  Lives in: multiple-level home  Lives with: spouse and young children    Employment status: working    Diagnostic Tests  X-ray: normal  Patient Goals  Patient goals for therapy: decreased pain, increased strength and increased motion  Patient goal: "Stability, strength, and move around without thinking about it "         Objective     Observations     Right Ankle/Foot   Positive for edema  Palpation   Left   No palpable tenderness to the anterior tibialis, lateral gastrocnemius, medial gastrocnemius, peroneus, posterior tibialis and soleus  Right   Tenderness of the anterior tibialis, lateral gastrocnemius, medial gastrocnemius, peroneus, posterior tibialis and soleus  Tenderness   Left Ankle/Foot   No tenderness  Right Ankle/Foot   Tenderness in the anterior ankle, anterior talofibular ligament, calcaneofibular ligament, fibula and lateral malleolus       Neurological Testing     Sensation     Ankle/Foot   Left Ankle/Foot   Intact: light touch    Right Ankle/Foot   Intact: light touch     Active Range of Motion   Left Ankle/Foot   Dorsiflexion (ke): 12 degrees   Plantar flexion: 70 degrees   Inversion: 40 degrees   Eversion: 30 degrees     Right Ankle/Foot   Dorsiflexion (ke): 10 degrees   Plantar flexion: 60 degrees   Inversion: 30 degrees   Eversion: 18 degrees     Passive Range of Motion   Left Ankle/Foot    Dorsiflexion (ke): 12 degrees   Plantar flexion: 70 degrees   Inversion: 40 degrees   Eversion: 30 degrees     Right Ankle/Foot    Dorsiflexion (ke): 10 degrees   Plantar flexion: 60 degrees   Inversion: 30 degrees   Eversion: 18 degrees     Strength/Myotome Testing     Left Ankle/Foot   Normal strength    Right Ankle/Foot   Dorsiflexion: 4-  Plantar flexion: 4-  Inversion: 4-  Eversion: 4-  Great toe flexion: 4-  Great toe extension: 4-    Additional Strength Details  B hip and knee MMT 5/5    Tests     Additional Tests Details  No special tests performed due to significant swelling of R ankle  Swelling   Left Ankle/Foot   Figure 8: 52 cm    Right Ankle/Foot   Figure 8: 56 cm    General Comments:       Ankle/Foot Comments   R SLB: 15 seconds   L SLB: 30 seconds     R heel raise: 5   L heel raise: 30      Flowsheet Rows      Most Recent Value   PT/OT G-Codes   Current Score  36   Projected Score  70             Precautions: Standard      Manuals 3/22       R ankle PROM        Gastroc stretch        Retrograde massage R ankle        KT tape swelling R ankle AR               Neuro Re-Ed                                Ther Ex        Bike warmup        Prostretch        HR         DF MWM at step        Gastroc stretch 3x15s       Ankle 4 way 10 YTB       Ankle ABCs 1       Wobble board        LAQ                        Gait Training                        Modalities        Ice post

## 2021-03-26 ENCOUNTER — OFFICE VISIT (OUTPATIENT)
Dept: PHYSICAL THERAPY | Facility: CLINIC | Age: 41
End: 2021-03-26
Payer: COMMERCIAL

## 2021-03-26 DIAGNOSIS — S93.401D SPRAIN OF RIGHT ANKLE, UNSPECIFIED LIGAMENT, SUBSEQUENT ENCOUNTER: Primary | ICD-10-CM

## 2021-03-26 DIAGNOSIS — M25.571 ACUTE RIGHT ANKLE PAIN: ICD-10-CM

## 2021-03-26 PROCEDURE — 97112 NEUROMUSCULAR REEDUCATION: CPT | Performed by: PHYSICAL THERAPIST

## 2021-03-26 PROCEDURE — 97110 THERAPEUTIC EXERCISES: CPT | Performed by: PHYSICAL THERAPIST

## 2021-03-26 PROCEDURE — 97140 MANUAL THERAPY 1/> REGIONS: CPT | Performed by: PHYSICAL THERAPIST

## 2021-03-26 NOTE — PROGRESS NOTES
Daily Note     Today's date: 3/26/2021  Patient name: Edwina Kemp  : 1980  MRN: [id number]  Referring provider: Suyapa Arita DO  Dx:   Encounter Diagnosis     ICD-10-CM    1  Sprain of right ankle, unspecified ligament, subsequent encounter  S93 401D    2  Acute right ankle pain  M25 571        Subjective: Pt reporting significant less pain since last visit   5/10 pain R ankle  K tape remained in place R lateral ankle  Objective: See treatment diary below      Assessment: Tolerated treatment well  Patient demonstrated fatigue post treatment  Pt able to progress through therex without in crease in pain R ankle  Pt pleased with increase in functional mobility since [facility]  STM to decrease swelling R ankle performed today  Plan: Continue per plan of care  Progress as tolerated as per primary PT       Precautions: Standard      Manuals 3/22 3/26      R ankle PROM  PROM R ankle as tolerated      Gastroc stretch  x10 hold 10      Retrograde massage R ankle  Retrograde STM to R ankle/LE      KT tape swelling R ankle AR -              Neuro Re-Ed        Ankle circles  x10 CW/CCW      SLR flexion  x10 hold 5 ankle pumps      SLS balance hip march  x10 hold 5      Ther Ex        Bike warmup  1 14 miles x 5 min, level 7      Prostretch  x10 hold 10      HR   x10      DF MWM at step  x10      Gastroc stretch 3x15s -      Ankle 4 way 10 YTB HEP      Ankle ABCs 1 HEP      Wobble board        LAQ  x10 with ankle pump      Lunge stretch  x5 hold 5 sec      Prone quad stretch/supine HS  x10 hold 20 sec      Gait Training                        Modalities        Ice post  x5 min R ankle

## 2021-04-02 ENCOUNTER — OFFICE VISIT (OUTPATIENT)
Dept: PHYSICAL THERAPY | Facility: CLINIC | Age: 41
End: 2021-04-02
Payer: COMMERCIAL

## 2021-04-02 DIAGNOSIS — S93.401D SPRAIN OF RIGHT ANKLE, UNSPECIFIED LIGAMENT, SUBSEQUENT ENCOUNTER: Primary | ICD-10-CM

## 2021-04-02 DIAGNOSIS — M25.571 ACUTE RIGHT ANKLE PAIN: ICD-10-CM

## 2021-04-02 PROCEDURE — 97140 MANUAL THERAPY 1/> REGIONS: CPT

## 2021-04-02 PROCEDURE — 97110 THERAPEUTIC EXERCISES: CPT

## 2021-04-02 NOTE — PROGRESS NOTES
Daily Note     Today's date: 2021  Patient name: Mateo Vanessa  : 1980  MRN: [id number]  Referring provider: Rosalba Winter DO  Dx:   Encounter Diagnosis     ICD-10-CM    1  Sprain of right ankle, unspecified ligament, subsequent encounter  S93 401D    2  Acute right ankle pain  M25 571        Start Time: 0845  Stop Time: 930  Total time in clinic (min): 45 minutes    Subjective: Patient states that her R ankle "feels really good " She was ascending/descending several flights of stairs this morning to carry wet laundry  She had minimal difficulties  Most of her pain is on the medial aspect of the R ankle  She has been working at her desk a significant amount this week and is elevating her ankle as much as she can  Objective: See treatment diary below      Assessment: Tolerated treatment well  Patient unable to perform unilateral heel raise due to medial R ankle pain  Minimal swelling present today  No pain noted post tx  Patient would benefit from continued PT to maximize R ankle strength, decrease swelling, and improve overall function for independence in community  Plan: Continue per plan of care        Precautions: Standard      Manuals 3/22 3/26 4/2     R ankle PROM  PROM R ankle as tolerated PROM R ankle as tolerated     Gastroc stretch  x10 hold 10 x10 hold 10     Retrograde massage R ankle  Retrograde STM to R ankle/LE Retrograde STM to R ankle/LE     KT tape swelling R ankle AR -              Neuro Re-Ed        Ankle circles  x10 CW/CCW      SLR flexion  x10 hold 5 ankle pumps x10 hold 5 ankle pumps     SLS balance hip march  x10 hold 5 On foam 3x10s     Hip add squeeze with ball at ankle   3sx10     Ther Ex        Bike warmup  1 14 miles x 5 min, level 7 1 14 miles x 5 min, level 7     Prostretch  x10 hold 10 x10 hold 10     HR   x10 DL 20   Eccentric focus 5     DF MWM at step  x10 x10     Gastroc stretch 3x15s -      Ankle 4 way 10 YTB HEP      Ankle ABCs 1 HEP      Wobble board   30 ea     LAQ  x10 with ankle pump x10 with ankle pump     Lunge stretch  x5 hold 5 sec x5 hold 5 sec     Prone quad stretch/supine HS  x10 hold 20 sec x5 20 sec     Gait Training                        Modalities        Ice post  x5 min R ankle

## 2021-04-09 DIAGNOSIS — Z23 ENCOUNTER FOR IMMUNIZATION: ICD-10-CM

## 2021-04-16 ENCOUNTER — OFFICE VISIT (OUTPATIENT)
Dept: PHYSICAL THERAPY | Facility: CLINIC | Age: 41
End: 2021-04-16
Payer: COMMERCIAL

## 2021-04-16 DIAGNOSIS — S93.401D SPRAIN OF RIGHT ANKLE, UNSPECIFIED LIGAMENT, SUBSEQUENT ENCOUNTER: Primary | ICD-10-CM

## 2021-04-16 DIAGNOSIS — M25.571 ACUTE RIGHT ANKLE PAIN: ICD-10-CM

## 2021-04-16 PROCEDURE — 97110 THERAPEUTIC EXERCISES: CPT

## 2021-04-16 NOTE — PROGRESS NOTES
Daily Note     Today's date: 2021  Patient name: Armando Kirby  : 1980  MRN: [id number]  Referring provider: Leandro Snow DO  Dx:   Encounter Diagnosis     ICD-10-CM    1  Sprain of right ankle, unspecified ligament, subsequent encounter  S93 401D    2  Acute right ankle pain  M25 571        Start Time: 0930  Stop Time: 1015  Total time in clinic (min): 45 minutes    Subjective: Patient states that she is "much better but is not 100% yet "       Objective: See treatment diary below  FOTO goal: 70%    IE: 36%    2021: 72%    Assessment: Tolerated treatment well  Patient is able to perform HR with better eccentric control and no pain  SL balance improving, with "weakness" reported when performing the exercise  Patient would benefit from continued PT to maximize function for return to gym activities and ambulation with no pain  Plan: Continue per plan of care        Precautions: Standard      Manuals 3/22 3/26 4/2 4/16    R ankle PROM  PROM R ankle as tolerated PROM R ankle as tolerated     Gastroc stretch  x10 hold 10 x10 hold 10     Retrograde massage R ankle  Retrograde STM to R ankle/LE Retrograde STM to R ankle/LE MRE x5'    KT tape swelling R ankle AR -              Neuro Re-Ed        Ankle circles  x10 CW/CCW  Bridging 15     SLR flexion  x10 hold 5 ankle pumps x10 hold 5 ankle pumps     SLS balance hip march  x10 hold 5 On foam 3x10s On foam 3x10s    Hip add squeeze with ball at ankle   3sx10     Ther Ex        Bike warmup  1 14 miles x 5 min, level 7 1 14 miles x 5 min, level 7 1 23 miles x5' level 9    Prostretch  x10 hold 10 x10 hold 10 x10 hold 10    HR   x10 DL 20   Eccentric focus 5 DL 20   Eccentric focus 5    DF MWM at step  x10 x10     Gastroc stretch 3x15s -      Ankle 4 way 10 YTB HEP      Ankle ABCs 1 HEP      Wobble board   30 ea     LAQ  x10 with ankle pump x10 with ankle pump x20 with ankle pump    Lunge stretch  x5 hold 5 sec x5 hold 5 sec x5 hold 5 sec    Prone quad stretch/supine HS  x10 hold 20 sec x5 20 sec     Bosu lunge   Bosu squat   Bosu step up    10 for, lat   10   10 R leading    TB row, ext SL     10 BTB            Modalities        Ice post  x5 min R ankle

## 2021-04-30 ENCOUNTER — OFFICE VISIT (OUTPATIENT)
Dept: PHYSICAL THERAPY | Facility: CLINIC | Age: 41
End: 2021-04-30
Payer: COMMERCIAL

## 2021-04-30 ENCOUNTER — HOSPITAL ENCOUNTER (OUTPATIENT)
Dept: RADIOLOGY | Facility: HOSPITAL | Age: 41
Discharge: HOME | End: 2021-04-30
Payer: COMMERCIAL

## 2021-04-30 ENCOUNTER — TRANSCRIBE ORDERS (OUTPATIENT)
Dept: ADMINISTRATIVE | Facility: HOSPITAL | Age: 41
End: 2021-04-30

## 2021-04-30 VITALS — BODY MASS INDEX: 34.15 KG/M2 | WEIGHT: 200 LBS | HEIGHT: 64 IN

## 2021-04-30 DIAGNOSIS — S93.401D SPRAIN OF RIGHT ANKLE, UNSPECIFIED LIGAMENT, SUBSEQUENT ENCOUNTER: Primary | ICD-10-CM

## 2021-04-30 DIAGNOSIS — M25.571 ACUTE RIGHT ANKLE PAIN: ICD-10-CM

## 2021-04-30 DIAGNOSIS — Z12.31 SCREENING MAMMOGRAM, ENCOUNTER FOR: ICD-10-CM

## 2021-04-30 PROCEDURE — 97110 THERAPEUTIC EXERCISES: CPT

## 2021-04-30 PROCEDURE — 77067 SCR MAMMO BI INCL CAD: CPT

## 2021-04-30 PROCEDURE — 77063 BREAST TOMOSYNTHESIS BI: CPT

## 2021-04-30 NOTE — PROGRESS NOTES
PT Discharge    Today's date: 2021  Patient name: Isabell Burton  : 1980  MRN: [id number]  Referring provider: Shannan Jaramillo DO  Dx:   Encounter Diagnosis     ICD-10-CM    1  Sprain of right ankle, unspecified ligament, subsequent encounter  S93 401D    2  Acute right ankle pain  M25 571        Start Time: 0930  Stop Time: 1015  Total time in clinic (min): 45 minutes    Assessment  Assessment details: Isabell Burton is a 42y o  year old female reports to physical therapy with symptoms consistent with referring diagnosis of: Acute right ankle pain  (primary encounter diagnosis), Sprain of right ankle, unspecified ligament, subsequent encounter (Plan: Ambulatory referral to Physical Therapy)  Patient demonstrates significant improvements in R ankle function, strength, and ROM  She is able to perform all SL exercises with no difficulties  Pain is at a minimum unless stressed in the frontal plane All goals have been met at this time  No functional limitations still present  Patient was provided with comprehensive home exercise program to maximize function for independence in community  Symptom irritability: low  Goals  STGs to be achieved in 4 weeks  -STG 1: Patient will be independent with HEP  - MET  -STG 2: Patient will have 0/10 R ankle pain at rest  -MET  -STG 3: Patient will increase R ankle ROM to within normal limits  -MET  -STG 4: Patient will report 40% functional improvement  -MET  -STG 5: Patient will demonstrate 1/2 grade MMT improvement in R ankle  -MET  -STG 6: Patient will be able to walk for greater than 15 minutes with no pain  -MET  -STG 7: Patient will perform 10 R heel raises with no pain  -MET  -STG 8: Figure 8 measurement of R ankle equal to that of the L ankle  -MET    LTGs to be achieved in 8 weeks  -LTG 1: Patient will demonstrate independence with maintenance program  - MET  -LTG 2: Patient will perform all functional activities with less than 2/10 R ankle pain  -MET  -LTG 3: Patient will improve FOTO score by 10 points  -MET  -LTG 4: Patient will report 80% functional improvement  -MET  -LTG 5: Patient will be able to walk for greater than 30 minutes with no pain  -MET  -LTG 6: Patient will demonstrate 1 grade MMT improvement in R ankle  -MET  -LTG 7: Patient will perform R single limb balance greater than 30 seconds  -MET    Plan  Plan details: Patient is to be discharged with comprehensive home exercise program, as she has met all goals at this time  Patient would benefit from: PT eval and skilled physical therapy  Planned modality interventions: TENS, cryotherapy, electrical stimulation/Russian stimulation, thermotherapy: hydrocollator packs, traction and ultrasound  Planned therapy interventions: manual therapy, massage, Gonzales taping, joint mobilization, neuromuscular re-education, patient education, balance, strengthening, stretching, therapeutic activities, therapeutic exercise, flexibility, functional ROM exercises, gait training, graded activity, graded exercise, graded motor, therapeutic training and home exercise program  Treatment plan discussed with: patient        Subjective Evaluation    History of Present Illness  Date of surgery: 3/7/2021  Mechanism of injury: trauma  Mechanism of injury: Patient states she rolled her ankle after playing with her child  She reports her ankle turned purple and swelled immediately  She was able to walk on her R ankle, but did have some discomfort  She felt as though the ankle felt great, but the "purple kept moving " She reports the last several days it has improved significantly, but her swelling is persistent  She reports being able to walk up and down the stairs with no difficulties  Functionally, she reports difficulties with walking greater than 15 minutes, walking up and down ramps, and ascending/descending stairs  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   She is nervous to walk around campus  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[place], [place]      2021: Patient denies pain in her R ankle today  At times, she notes increased pain on the medial aspect of her R ankle  Functionally, she reports 95% improvement since the start of PT  Patient reports difficulties with turning fast due to medial R ankle pain  She feels as though she is not back to her 100% function  Pain  Current pain ratin  At best pain ratin  At worst pain ratin  Quality: dull ache  Relieving factors: ice and medications  Aggravating factors: standing, lifting, stair climbing and walking    Social Support  Steps to enter house: yes  3  Stairs in house: yes   12  Lives in: multiple-level home  Lives with: spouse and young children    Employment status: working    Diagnostic Tests  X-ray: normal  Patient Goals  Patient goals for therapy: decreased pain, increased strength and increased motion  Patient goal: "Stability, strength, and move around without thinking about it "         Objective     Observations     Right Ankle/Foot   Positive for edema  Palpation   Left   No palpable tenderness to the anterior tibialis, lateral gastrocnemius, medial gastrocnemius, peroneus, posterior tibialis and soleus  Right   No palpable tenderness to the anterior tibialis, lateral gastrocnemius, medial gastrocnemius, peroneus, posterior tibialis and soleus  Tenderness   Left Ankle/Foot   No tenderness  Right Ankle/Foot   Tenderness in the deltoid ligament       Neurological Testing     Sensation     Ankle/Foot   Left Ankle/Foot   Intact: light touch    Right Ankle/Foot   Intact: light touch     Active Range of Motion   Left Ankle/Foot   Dorsiflexion (ke): 12 degrees   Plantar flexion: 70 degrees   Inversion: 40 degrees   Eversion: 30 degrees     Right Ankle/Foot   Dorsiflexion (ke): 10 degrees   Plantar flexion: 70 degrees   Inversion: 50 degrees   Eversion: 30 degrees     Passive Range of Motion   Left Ankle/Foot    Dorsiflexion (ke): 12 degrees   Plantar flexion: 70 degrees   Inversion: 40 degrees   Eversion: 30 degrees     Right Ankle/Foot    Dorsiflexion (ke): 10 degrees   Plantar flexion: 70 degrees   Inversion: 50 degrees   Eversion: 30 degrees     Strength/Myotome Testing     Left Ankle/Foot   Normal strength    Right Ankle/Foot   Dorsiflexion: 4+  Plantar flexion: 4+  Inversion: 4+  Eversion: 4+  Great toe flexion: 4+  Great toe extension: 4+    Additional Strength Details  B hip and knee MMT 5/5    Tests     Additional Tests Details  No special tests performed due to significant swelling of R ankle  Swelling   Left Ankle/Foot   Figure 8: 52 cm    Right Ankle/Foot   Figure 8: 53 cm    General Comments:       Ankle/Foot Comments   R SLB: 30 seconds   L SLB: 30 seconds     R heel raise: 30   L heel raise: 30    FOTO goal: 70%   IE: 36%    4/16: 72%    4/30: 86%      Flowsheet Rows      Most Recent Value   PT/OT G-Codes   Current Score  86   Projected Score  70             Precautions: Standard      Manuals 3/22 3/26 4/2 4/16 4/30   R ankle PROM  PROM R ankle as tolerated PROM R ankle as tolerated     Gastroc stretch  x10 hold 10 x10 hold 10     Retrograde massage R ankle  Retrograde STM to R ankle/LE Retrograde STM to R ankle/LE MRE x5'    KT tape swelling R ankle AR -              Neuro Re-Ed        Ankle circles  x10 CW/CCW  Bridging 15     SLR flexion  x10 hold 5 ankle pumps x10 hold 5 ankle pumps     SLS balance hip march  x10 hold 5 On foam 3x10s On foam 3x10s On foam 3x10s   Hip add squeeze with ball at ankle   3sx10     Ther Ex        Bike warmup  1 14 miles x 5 min, level 7 1 14 miles x 5 min, level 7 1 23 miles x5' level 9 5' 1 26 miles L11   Prostretch  x10 hold 10 x10 hold 10 x10 hold 10 x10 hold 10   HR   x10 DL 20   Eccentric focus 5 DL 20   Eccentric focus 5 DL 20   Eccentric focus 5   DF MWM at step  x10 x10     Gastroc stretch 3x15s -      Ankle 4 way 10 YTB HEP      Ankle ABCs 1 HEP Wobble board   30 ea     LAQ  x10 with ankle pump x10 with ankle pump x20 with ankle pump    Lunge stretch  x5 hold 5 sec x5 hold 5 sec x5 hold 5 sec x5 hold 5 sec   Prone quad stretch/supine HS  x10 hold 20 sec x5 20 sec     Bosu lunge   Bosu squat   Bosu step up    10 for, lat   10   10 R leading 10 for, lat   10   10 R leading   TB row, ext SL     10 BTB 10 BTB           Modalities        Ice post  x5 min R ankle

## 2021-05-03 ENCOUNTER — IMMUNIZATIONS (OUTPATIENT)
Dept: FAMILY MEDICINE CLINIC | Facility: HOSPITAL | Age: 41
End: 2021-05-03

## 2021-05-03 DIAGNOSIS — Z23 ENCOUNTER FOR IMMUNIZATION: Primary | ICD-10-CM

## 2021-05-03 PROCEDURE — 0011A SARS-COV-2 / COVID-19 MRNA VACCINE (MODERNA) 100 MCG: CPT

## 2021-05-03 PROCEDURE — 91301 SARS-COV-2 / COVID-19 MRNA VACCINE (MODERNA) 100 MCG: CPT

## 2021-05-16 ENCOUNTER — NURSE TRIAGE (OUTPATIENT)
Dept: OTHER | Facility: OTHER | Age: 41
End: 2021-05-16

## 2021-05-16 DIAGNOSIS — Z11.59 SPECIAL SCREENING EXAMINATION FOR VIRAL DISEASE: ICD-10-CM

## 2021-05-16 DIAGNOSIS — Z11.59 SPECIAL SCREENING EXAMINATION FOR VIRAL DISEASE: Primary | ICD-10-CM

## 2021-05-16 PROCEDURE — U0003 INFECTIOUS AGENT DETECTION BY NUCLEIC ACID (DNA OR RNA); SEVERE ACUTE RESPIRATORY SYNDROME CORONAVIRUS 2 (SARS-COV-2) (CORONAVIRUS DISEASE [COVID-19]), AMPLIFIED PROBE TECHNIQUE, MAKING USE OF HIGH THROUGHPUT TECHNOLOGIES AS DESCRIBED BY CMS-2020-01-R: HCPCS | Performed by: STUDENT IN AN ORGANIZED HEALTH CARE EDUCATION/TRAINING PROGRAM

## 2021-05-16 PROCEDURE — U0005 INFEC AGEN DETEC AMPLI PROBE: HCPCS | Performed by: STUDENT IN AN ORGANIZED HEALTH CARE EDUCATION/TRAINING PROGRAM

## 2021-05-16 NOTE — TELEPHONE ENCOUNTER
Reason for Disposition   [1] COVID-19 infection suspected by caller or triager AND [2] mild symptoms (cough, fever, or others) AND [1] no complications or SOB    Answer Assessment - Initial Assessment Questions  1  Were you within 6 feet or less, for up to 15 minutes or more with a person that has a confirmed COVID-19 test?   No    2  What was the date of your exposure? N/a    3  Are you experiencing any symptoms attributed to the virus? (Assess for SOB, cough, fever, difficulty breathing)  Fever 100 7 (oral), nausea, and vomiting on 5/15    HIGH RISK: Do you have any history heart or lung conditions, weakened immune system, diabetes, Asthma, CHF, HIV, COPD, Chemo, renal failure, sickle cell, etc?   Denies    Protocols used: CORONAVIRUS (COVID-19) DIAGNOSED OR SUSPECTED-ADULT-      Pt requesting covid test based on symptoms   No known exposure  Instructed patient to call office tomorrow to reschedule appt for Monday if results do not come back before then  Pt verbalized understanding

## 2021-05-16 NOTE — TELEPHONE ENCOUNTER
Regarding: covid was symptomatic   ----- Message from Barbi Poe sent at 5/16/2021  9:32 AM EDT -----  " I had a fever and nausous yesterday, no symptoms today, no known exposure "

## 2021-05-18 LAB — SARS-COV-2 N GENE RESP QL NAA+PROBE: NEGATIVE

## 2021-05-27 ENCOUNTER — OFFICE VISIT (OUTPATIENT)
Dept: FAMILY MEDICINE CLINIC | Facility: CLINIC | Age: 41
End: 2021-05-27
Payer: COMMERCIAL

## 2021-05-27 VITALS
SYSTOLIC BLOOD PRESSURE: 118 MMHG | WEIGHT: 204 LBS | OXYGEN SATURATION: 95 % | HEIGHT: 64 IN | DIASTOLIC BLOOD PRESSURE: 68 MMHG | RESPIRATION RATE: 18 BRPM | TEMPERATURE: 98.1 F | BODY MASS INDEX: 34.83 KG/M2 | HEART RATE: 95 BPM

## 2021-05-27 DIAGNOSIS — Z13.1 SCREENING FOR DIABETES MELLITUS: ICD-10-CM

## 2021-05-27 DIAGNOSIS — R53.83 FATIGUE, UNSPECIFIED TYPE: ICD-10-CM

## 2021-05-27 DIAGNOSIS — Z00.00 ANNUAL PHYSICAL EXAM: Primary | ICD-10-CM

## 2021-05-27 DIAGNOSIS — Z13.220 SCREENING FOR HYPERLIPIDEMIA: ICD-10-CM

## 2021-05-27 DIAGNOSIS — Z11.3 SCREEN FOR STD (SEXUALLY TRANSMITTED DISEASE): ICD-10-CM

## 2021-05-27 PROCEDURE — 99396 PREV VISIT EST AGE 40-64: CPT | Performed by: FAMILY MEDICINE

## 2021-05-27 NOTE — PROGRESS NOTES
Assessment/Plan:     Diagnoses and all orders for this visit:    Annual physical exam  The patient is advised to follow a low fat, low cholesterol diet, attempt to lose weight, reduce salt in diet and cooking, reduce exposure to stress, improve dietary compliance, use calcium 1 gram daily with Vit D and continue current healthy lifestyle patterns  Fatigue, unspecified type  -     Comprehensive metabolic panel; Future  -     CBC and Platelet; Future    Screen for STD (sexually transmitted disease)  -     Chlamydia/GC amplified DNA by PCR; Future    Screening for hyperlipidemia  -     Lipid panel; Future    Screening for diabetes mellitus  -     Hemoglobin A1C; Future          Subjective:      Patient ID: Mike Chapa is a 42 y o  female  23-year-old female with a past medical history of asthma and obesity who is presenting today for evaluation and complete physical examination  Patient currently denies any acute medical issues  Denies use of tobacco products and only sparingly uses alcohol  Patient states she needs to improve on a diet and weight  Currently exercises 3 to 5 times a week with each session lasting about 90 minutes  Patient is currently interested in IUD insertion as a form of birth control, and is trying to get information between copper IUD versus hormonal IUD  Patient currently endorses some fatigue, but denies SOB, chest pain, subjective fever, N/ V/ D,   Or genitourinary symptoms  The following portions of the patient's history were reviewed and updated as appropriate: She  has a past medical history of Asthma and Urinary tract infection    She   Patient Active Problem List    Diagnosis Date Noted    Encounter for general adult medical examination with abnormal findings 08/15/2019    Class 1 obesity due to excess calories without serious comorbidity in adult 08/14/2019    Possible exposure to STD 08/14/2019    Change in facial mole 07/20/2018    Exposure to TB 02/15/2018  FH: breast cancer in first degree relative 02/15/2018    Encounter for screening for malignant neoplasm of breast 02/15/2018     She  has a past surgical history that includes Multiple tooth extractions  Her family history includes Basal cell carcinoma in her mother; Breast cancer (age of onset: 68) in her mother; Diabetes in her family and father; Hypertension in her family; No Known Problems in her daughter, maternal aunt, maternal aunt, maternal aunt, paternal aunt, paternal aunt, paternal aunt, paternal aunt, and paternal aunt  She  reports that she has never smoked  She has never used smokeless tobacco  She reports current alcohol use  She reports that she does not use drugs  Current Outpatient Medications   Medication Sig Dispense Refill    albuterol (PROVENTIL HFA,VENTOLIN HFA) 90 mcg/act inhaler Inhale 2 puffs every 6 (six) hours as needed for wheezing or shortness of breath 1 Inhaler 5     No current facility-administered medications for this visit  Current Outpatient Medications on File Prior to Visit   Medication Sig    albuterol (PROVENTIL HFA,VENTOLIN HFA) 90 mcg/act inhaler Inhale 2 puffs every 6 (six) hours as needed for wheezing or shortness of breath     No current facility-administered medications on file prior to visit  She is allergic to pollen extract       Review of Systems   Constitutional: Positive for fatigue  HENT: Negative  Respiratory: Negative  Cardiovascular: Negative  Gastrointestinal: Negative  Genitourinary: Negative  Musculoskeletal: Negative  Neurological: Negative  Objective:      /68 (BP Location: Left arm, Patient Position: Sitting, Cuff Size: Adult)   Pulse 95   Temp 98 1 °F (36 7 °C) (Tympanic)   Resp 18   Ht 5' 4" (1 626 m)   Wt 92 5 kg (204 lb)   LMP 04/30/2021   SpO2 95%   BMI 35 02 kg/m²          Physical Exam  Vitals signs reviewed  Constitutional:       General: She is not in acute distress       Appearance: She is obese  She is not toxic-appearing  HENT:      Head: Normocephalic and atraumatic  Right Ear: Tympanic membrane and external ear normal       Left Ear: Tympanic membrane and external ear normal       Nose: Nose normal  No congestion or rhinorrhea  Mouth/Throat:      Mouth: Mucous membranes are moist       Pharynx: Oropharynx is clear  No oropharyngeal exudate  Eyes:      General: No scleral icterus  Right eye: No discharge  Left eye: No discharge  Extraocular Movements: Extraocular movements intact  Conjunctiva/sclera: Conjunctivae normal       Pupils: Pupils are equal, round, and reactive to light  Cardiovascular:      Rate and Rhythm: Normal rate and regular rhythm  Pulses: Normal pulses  Heart sounds: Normal heart sounds  No murmur  No friction rub  No gallop  Pulmonary:      Effort: Pulmonary effort is normal  No respiratory distress  Breath sounds: Normal breath sounds  No wheezing or rales  Abdominal:      General: Abdomen is flat  Bowel sounds are normal  There is no distension  Palpations: Abdomen is soft  Tenderness: There is no abdominal tenderness  Musculoskeletal: Normal range of motion  General: No swelling  Right lower leg: No edema  Skin:     General: Skin is warm  Capillary Refill: Capillary refill takes less than 2 seconds  Neurological:      Mental Status: She is alert and oriented to person, place, and time

## 2021-05-28 ENCOUNTER — TELEMEDICINE (OUTPATIENT)
Dept: FAMILY MEDICINE CLINIC | Facility: CLINIC | Age: 41
End: 2021-05-28
Payer: COMMERCIAL

## 2021-05-28 ENCOUNTER — NURSE TRIAGE (OUTPATIENT)
Dept: OTHER | Facility: OTHER | Age: 41
End: 2021-05-28

## 2021-05-28 DIAGNOSIS — B34.9 VIRAL INFECTION, UNSPECIFIED: Primary | ICD-10-CM

## 2021-05-28 DIAGNOSIS — B34.9 VIRAL INFECTION, UNSPECIFIED: ICD-10-CM

## 2021-05-28 PROCEDURE — 99213 OFFICE O/P EST LOW 20 MIN: CPT | Performed by: FAMILY MEDICINE

## 2021-05-28 PROCEDURE — U0003 INFECTIOUS AGENT DETECTION BY NUCLEIC ACID (DNA OR RNA); SEVERE ACUTE RESPIRATORY SYNDROME CORONAVIRUS 2 (SARS-COV-2) (CORONAVIRUS DISEASE [COVID-19]), AMPLIFIED PROBE TECHNIQUE, MAKING USE OF HIGH THROUGHPUT TECHNOLOGIES AS DESCRIBED BY CMS-2020-01-R: HCPCS | Performed by: STUDENT IN AN ORGANIZED HEALTH CARE EDUCATION/TRAINING PROGRAM

## 2021-05-28 PROCEDURE — U0005 INFEC AGEN DETEC AMPLI PROBE: HCPCS | Performed by: STUDENT IN AN ORGANIZED HEALTH CARE EDUCATION/TRAINING PROGRAM

## 2021-05-28 NOTE — TELEPHONE ENCOUNTER
"I need a COVID-19 test "    This is a patient of [facility] and the office is open  She was warm transferred to the office for assistance per [facility] protocol

## 2021-05-28 NOTE — PROGRESS NOTES
COVID-19 Outpatient Progress Note    Assessment/Plan:    Problem List Items Addressed This Visit     None      Visit Diagnoses     Viral infection, unspecified    -  Primary    Relevant Orders    Novel Coronavirus (Covid-19),PCR [facility] - Collected at Mobile [facility] or [facility]         Disposition:     I recommended self-quarantine for 10 days and to watch for symptoms until 14 days after exposure  If patient were to develop symptoms, they should self isolate and call our office for further guidance  I referred patient to one of our centralized sites for a COVID-19 swab  I have spent 15 minutes directly with the patient  Greater than 50% of this time was spent in counseling/coordination of care regarding: instructions for management, patient and family education, risk factor reductions and impressions  Patient is planned to receive dose 2 of 2 od COVID vaccine on Tuesday June 1  She is to await results prior to getting next vaccine  Encounter provider Blank Ordoñez MD    Provider located at [address] [place] [id number]    Recent Visits  Date Type Provider Dept   05/27/21 Office Visit Ayala Young DO [facility]   Showing recent visits within past 7 days and meeting all other requirements     Today's Visits  Date Type Provider Dept   05/28/21 Telemedicine Norma Vick MD [facility]   Showing today's visits and meeting all other requirements     Future Appointments  No visits were found meeting these conditions  Showing future appointments within next 150 days and meeting all other requirements      This virtual check-in was done via [employer] and patient was informed that this is a secure, HIPAA-compliant platform  She agrees to proceed  Patient agrees to participate in a virtual check in via telephone or video visit instead of presenting to the office to address urgent/immediate medical needs   Patient is aware this is a billable service  After connecting through [facility], the patient was identified by name and date of birth  Dev Meléndez was informed that this was a telemedicine visit and that the exam was being conducted confidentially over secure lines  My office door was closed  No one else was in the room  Dev Meléndez acknowledged consent and understanding of privacy and security of the telemedicine visit  I informed the patient that I have reviewed her record in Epic and presented the opportunity for her to ask any questions regarding the visit today  The patient agreed to participate  Subjective:   Dev Meléndez is a 42 y o  female who is concerned about COVID-19  Patient's symptoms include fever (Temp of 101 6 last night and 100 5 this morning), chills, fatigue, nasal congestion, myalgias and headache  Patient denies malaise, rhinorrhea, sore throat, anosmia, loss of taste, cough, shortness of breath, chest tightness, abdominal pain, nausea, vomiting and diarrhea       Date of symptom onset: 5/27/2021    Exposure:   Contact with a person who is under investigation (PUI) for or who is positive for COVID-19 within the last 14 days?: No    Hospitalized recently for fever and/or lower respiratory symptoms?: No      Currently a healthcare worker that is involved in direct patient care?: No      Works in a special setting where the risk of COVID-19 transmission may be high? (this may include long-term care, correctional and senior living facilities; homeless shelters; assisted-living facilities and group homes ): No      Resident in a special setting where the risk of COVID-19 transmission may be high? (this may include long-term care, correctional and senior living facilities; homeless shelters; assisted-living facilities and group homes ): No      Lab Results   Component Value Date    SARSCOV2 Negative 05/16/2021     Past Medical History:   Diagnosis Date    Asthma     Urinary tract infection Past Surgical History:   Procedure Laterality Date    MULTIPLE TOOTH EXTRACTIONS       Current Outpatient Medications   Medication Sig Dispense Refill    albuterol (PROVENTIL HFA,VENTOLIN HFA) 90 mcg/act inhaler Inhale 2 puffs every 6 (six) hours as needed for wheezing or shortness of breath 1 Inhaler 5     No current facility-administered medications for this visit  Allergies   Allergen Reactions    Pollen Extract Allergic Rhinitis       Review of Systems   Constitutional: Positive for chills, fatigue and fever (Temp of 101 6 last night and 100 5 this morning)  HENT: Positive for congestion  Negative for rhinorrhea and sore throat  Respiratory: Negative for cough, chest tightness and shortness of breath  Gastrointestinal: Negative for abdominal pain, diarrhea, nausea and vomiting  Musculoskeletal: Positive for myalgias  Neurological: Positive for headaches  Objective: There were no vitals filed for this visit  Physical Exam  Pulmonary:      Effort: Pulmonary effort is normal  No respiratory distress  Neurological:      General: No focal deficit present  Mental Status: She is alert and oriented to person, place, and time  Psychiatric:         Mood and Affect: Mood normal          Behavior: Behavior normal          Thought Content: Thought content normal          Judgment: Judgment normal        VIRTUAL VISIT DISCLAIMER    Rashawn Nath acknowledges that she has consented to an online visit or consultation  She understands that the online visit is based solely on information provided by her, and that, in the absence of a face-to-face physical evaluation by the physician, the diagnosis she receives is both limited and provisional in terms of accuracy and completeness  This is not intended to replace a full medical face-to-face evaluation by the physician  Rashawn Nath understands and accepts these terms

## 2021-05-30 LAB — SARS-COV-2 RNA RESP QL NAA+PROBE: NEGATIVE

## 2021-06-04 ENCOUNTER — OFFICE VISIT (OUTPATIENT)
Dept: FAMILY MEDICINE CLINIC | Facility: CLINIC | Age: 41
End: 2021-06-04
Payer: COMMERCIAL

## 2021-06-04 VITALS
OXYGEN SATURATION: 96 % | HEART RATE: 112 BPM | RESPIRATION RATE: 16 BRPM | BODY MASS INDEX: 34.25 KG/M2 | HEIGHT: 64 IN | TEMPERATURE: 98.2 F | DIASTOLIC BLOOD PRESSURE: 82 MMHG | SYSTOLIC BLOOD PRESSURE: 124 MMHG | WEIGHT: 200.6 LBS

## 2021-06-04 DIAGNOSIS — N89.8 VAGINAL DISCHARGE: ICD-10-CM

## 2021-06-04 DIAGNOSIS — N89.8 VAGINAL LESION: ICD-10-CM

## 2021-06-04 DIAGNOSIS — Z72.51 HIGH RISK HETEROSEXUAL BEHAVIOR: Primary | ICD-10-CM

## 2021-06-04 PROCEDURE — 99213 OFFICE O/P EST LOW 20 MIN: CPT | Performed by: FAMILY MEDICINE

## 2021-06-05 ENCOUNTER — NURSE TRIAGE (OUTPATIENT)
Dept: OTHER | Facility: OTHER | Age: 41
End: 2021-06-05

## 2021-06-05 DIAGNOSIS — B00.9 HSV-2 (HERPES SIMPLEX VIRUS 2) INFECTION: Primary | ICD-10-CM

## 2021-06-05 RX ORDER — VALACYCLOVIR HYDROCHLORIDE 1 G/1
1000 TABLET, FILM COATED ORAL 2 TIMES DAILY
Qty: 14 TABLET | Refills: 0 | Status: SHIPPED | OUTPATIENT
Start: 2021-06-05 | End: 2021-08-19 | Stop reason: ALTCHOICE

## 2021-06-05 NOTE — PROGRESS NOTES
Assessment/Plan:      Diagnoses and all orders for this visit:    High risk heterosexual behavior  -     HIV 1/2 Antigen/Antibody (4th Generation) w Reflex [facility]; Future  -     Chlamydia/GC amplified DNA by PCR  -     HSV TYPE 1,2 DNA PCR    Vaginal discharge  -     Genital Comprehensive Culture    Vaginal lesion  -     Chlamydia/GC amplified DNA by PCR  -     HSV TYPE 1,2 DNA PCR      Given patient history, physical exam, and current symptoms of pain, will started valtrex for treatment of genital herpes  Patient advised that her partner also requires treatment, and she is also currently infectious and should refrain from intercourse  Will follow up above PCR and cultures when resulted  Subjective:     Patient ID: Mike Chapa is a 42 y o  female who presents today with concerns of change in vaginal discharge as well as genital sores in labia, vagina, and anus  Patient is with a new male sale partner these past 6 months  They use plan B for contraception, no form of barrier contraception  She knows that he has sex with other partners  Over the past few days, she had noticed pain around her vagina and anus  They have not had intercourse since the symptoms started  She also notes increased vaginal discharge and odor  She denies vaginal bleeding, fatigue, chest pain, back pain, dysuria, increased urinary frequency, hematuria flank pain  She reports fever 3-4 days ago but no fever for the past 2 days  She denies history of STDs in the past       Review of Systems   Constitutional: Negative for activity change, appetite change, chills, diaphoresis, fatigue and fever  Respiratory: Negative for cough and chest tightness  Cardiovascular: Negative for chest pain, palpitations and leg swelling  Gastrointestinal: Positive for rectal pain  Negative for abdominal pain, constipation, diarrhea, nausea and vomiting  Genitourinary: Positive for genital sores, vaginal discharge and vaginal pain   Negative for difficulty urinating, dyspareunia, dysuria, flank pain, hematuria, menstrual problem, urgency and vaginal bleeding  Musculoskeletal: Negative for arthralgias, back pain, joint swelling and myalgias  Neurological: Negative for headaches  Objective:     Physical Exam  Vitals signs reviewed  Exam conducted with a chaperone present  Genitourinary:     Exam position: Supine  Neurological:      Mental Status: She is alert

## 2021-06-05 NOTE — TELEPHONE ENCOUNTER
Patient made aware message would be sent to on call provider to order medication and nurse would call her back  TC sent to on call provider       TC received on call provider called in script    Patient made aware

## 2021-06-05 NOTE — PROGRESS NOTES
Received health call from patient stating that she did not receive Valtrex for herpes infection  Reviewed Dr Dayana Rizo note from yesterday  Sent Valtrex 1000 mg po BID for 7 days to patient's pharmacy

## 2021-06-05 NOTE — TELEPHONE ENCOUNTER
Regardin41 Y/O RX NOT SENT IN TO PHARMACY  ----- Message from Mariana Cantu sent at 2021  9:19 AM EDT -----  "I was seen in the office yesterday and was to have a antiviral medication sent in to begin treatment immediately but nothing was sent to pharmacy "  [facility] ([address] [facility], [address] [facility] [facility] [phone] (Phone)  [phone] (Fax)

## 2021-06-06 LAB
ALBUMIN SERPL-MCNC: 4.5 G/DL (ref 3.8–4.8)
ALBUMIN/GLOB SERPL: 1.4 {RATIO} (ref 1.2–2.2)
ALP SERPL-CCNC: 50 IU/L (ref 48–121)
ALT SERPL-CCNC: 19 IU/L (ref 0–32)
AST SERPL-CCNC: 21 IU/L (ref 0–40)
BASOPHILS # BLD AUTO: 0.1 X10E3/UL (ref 0–0.2)
BASOPHILS NFR BLD AUTO: 1 %
BILIRUB SERPL-MCNC: 0.4 MG/DL (ref 0–1.2)
BUN SERPL-MCNC: 9 MG/DL (ref 6–24)
BUN/CREAT SERPL: 9 (ref 9–23)
C TRACH RRNA SPEC QL NAA+PROBE: NEGATIVE
CALCIUM SERPL-MCNC: 9 MG/DL (ref 8.7–10.2)
CHLORIDE SERPL-SCNC: 102 MMOL/L (ref 96–106)
CHOLEST SERPL-MCNC: 142 MG/DL (ref 100–199)
CO2 SERPL-SCNC: 25 MMOL/L (ref 20–29)
CREAT SERPL-MCNC: 0.96 MG/DL (ref 0.57–1)
EOSINOPHIL # BLD AUTO: 0.2 X10E3/UL (ref 0–0.4)
EOSINOPHIL NFR BLD AUTO: 3 %
ERYTHROCYTE [DISTWIDTH] IN BLOOD BY AUTOMATED COUNT: 11.7 % (ref 11.7–15.4)
GLOBULIN SER-MCNC: 3.2 G/DL (ref 1.5–4.5)
GLUCOSE SERPL-MCNC: 91 MG/DL (ref 65–99)
HBA1C MFR BLD: 5.6 % (ref 4.8–5.6)
HCT VFR BLD AUTO: 43.2 % (ref 34–46.6)
HDLC SERPL-MCNC: 27 MG/DL
HGB BLD-MCNC: 14.9 G/DL (ref 11.1–15.9)
IMM GRANULOCYTES # BLD: 0 X10E3/UL (ref 0–0.1)
IMM GRANULOCYTES NFR BLD: 1 %
LDLC SERPL CALC-MCNC: 95 MG/DL (ref 0–99)
LYMPHOCYTES # BLD AUTO: 3.1 X10E3/UL (ref 0.7–3.1)
LYMPHOCYTES NFR BLD AUTO: 37 %
MCH RBC QN AUTO: 31.2 PG (ref 26.6–33)
MCHC RBC AUTO-ENTMCNC: 34.5 G/DL (ref 31.5–35.7)
MCV RBC AUTO: 90 FL (ref 79–97)
MONOCYTES # BLD AUTO: 0.6 X10E3/UL (ref 0.1–0.9)
MONOCYTES NFR BLD AUTO: 7 %
MORPHOLOGY BLD-IMP: NORMAL
N GONORRHOEA RRNA SPEC QL NAA+PROBE: NEGATIVE
NEUTROPHILS # BLD AUTO: 4.4 X10E3/UL (ref 1.4–7)
NEUTROPHILS NFR BLD AUTO: 51 %
PLATELET # BLD AUTO: 240 X10E3/UL (ref 150–450)
POTASSIUM SERPL-SCNC: 4.2 MMOL/L (ref 3.5–5.2)
PROT SERPL-MCNC: 7.7 G/DL (ref 6–8.5)
RBC # BLD AUTO: 4.78 X10E6/UL (ref 3.77–5.28)
SL AMB EGFR AFRICAN AMERICAN: 84 ML/MIN/1.73
SL AMB EGFR NON AFRICAN AMERICAN: 73 ML/MIN/1.73
SL AMB VLDL CHOLESTEROL CALC: 20 MG/DL (ref 5–40)
SODIUM SERPL-SCNC: 139 MMOL/L (ref 134–144)
TRIGL SERPL-MCNC: 110 MG/DL (ref 0–149)
WBC # BLD AUTO: 8.5 X10E3/UL (ref 3.4–10.8)

## 2021-06-07 ENCOUNTER — IMMUNIZATIONS (OUTPATIENT)
Dept: FAMILY MEDICINE CLINIC | Facility: HOSPITAL | Age: 41
End: 2021-06-07

## 2021-06-07 DIAGNOSIS — Z23 ENCOUNTER FOR IMMUNIZATION: Primary | ICD-10-CM

## 2021-06-07 PROCEDURE — 91301 SARS-COV-2 / COVID-19 MRNA VACCINE (MODERNA) 100 MCG: CPT

## 2021-06-07 PROCEDURE — 0012A SARS-COV-2 / COVID-19 MRNA VACCINE (MODERNA) 100 MCG: CPT

## 2021-06-07 NOTE — TELEPHONE ENCOUNTER
Spoke with patient, states she picked up his Valtrex but she was expecting 10 day course as per her conversation with you but only 7 day course was ordered  If needs 10 days, would like sent to [facility] in [place]    Thanks

## 2021-06-08 NOTE — TELEPHONE ENCOUNTER
Left message for appointment informing no further prescription needs to be called in as per Dr Cole Prom and advised to call back with any further concerns

## 2021-06-09 LAB
BACTERIA GENITAL AEROBE CULT: ABNORMAL
C TRACH RRNA SPEC QL NAA+PROBE: NEGATIVE
HSV1 DNA SPEC QL NAA+PROBE: NEGATIVE
HSV2 DNA SPEC QL NAA+PROBE: POSITIVE
Lab: ABNORMAL
Lab: ABNORMAL
N GONORRHOEA RRNA SPEC QL NAA+PROBE: NEGATIVE

## 2021-06-10 ENCOUNTER — TELEPHONE (OUTPATIENT)
Dept: FAMILY MEDICINE CLINIC | Facility: CLINIC | Age: 41
End: 2021-06-10

## 2021-06-10 DIAGNOSIS — B96.89 BACTERIAL VAGINAL INFECTION: Primary | ICD-10-CM

## 2021-06-10 DIAGNOSIS — N76.0 BACTERIAL VAGINITIS: ICD-10-CM

## 2021-06-10 DIAGNOSIS — N76.0 BACTERIAL VAGINAL INFECTION: Primary | ICD-10-CM

## 2021-06-10 DIAGNOSIS — B96.89 BACTERIAL VAGINITIS: ICD-10-CM

## 2021-06-10 RX ORDER — METRONIDAZOLE 500 MG/1
500 TABLET ORAL EVERY 12 HOURS SCHEDULED
Qty: 14 TABLET | Refills: 0 | Status: SHIPPED | OUTPATIENT
Start: 2021-06-10 | End: 2021-06-17

## 2021-06-10 NOTE — PROGRESS NOTES
Patient prescribed Valtrex for genital herpes at last visit  Culture showed negative GC chlamydia, positive for Gardnerella  Metronidazole 500 mg b i d  x7 days sent to patient's pharmacy  Attempted to call patient to discuss test results  Patient needs to be advised that she needs to complete 7 days metronidazole  Unable to reach patient  Voicemail left at listed number asking patient to call back our office

## 2021-06-21 ENCOUNTER — TELEMEDICINE (OUTPATIENT)
Dept: FAMILY MEDICINE CLINIC | Facility: CLINIC | Age: 41
End: 2021-06-21
Payer: COMMERCIAL

## 2021-06-21 DIAGNOSIS — Z30.09 ENCOUNTER FOR COUNSELING REGARDING CONTRACEPTION: Primary | ICD-10-CM

## 2021-06-21 PROCEDURE — 99213 OFFICE O/P EST LOW 20 MIN: CPT | Performed by: FAMILY MEDICINE

## 2021-06-21 NOTE — PROGRESS NOTES
Virtual Brief Visit    Assessment/Plan:    Problem List Items Addressed This Visit        Other    Encounter for counseling regarding contraception - Primary            Risks and benefits of IUD placement discussed again, including but not limited to  Expulsion, bleeding, infection  Inability to tolerate device, cramping, increased bleeding,   Rupture through the  Uterus  Patient would like placement of the copper IUD at next visit  Encounter provider Astrid Hudson MD    Provider located at [address] [place] [id number]    Recent Visits  No visits were found meeting these conditions  Showing recent visits within past 7 days and meeting all other requirements  Future Appointments  No visits were found meeting these conditions  Showing future appointments within next 150 days and meeting all other requirements       After connecting through telephone, the patient was identified by name and date of birth  Mayra Allen was informed that this is a telemedicine visit and that the visit is being conducted through telephone  My office door was closed  No one else was in the room  She acknowledged consent and understanding of privacy and security of the platform  The patient has agreed to participate and understands she can discontinue the visit at any time  Patient is aware this is a billable service  Subjective    Mayra Allen is a 42 y o  female Presents today for counseling regarding IUD contraception  Patient was seen 2 weeks ago with symptoms of vaginal discharge been diagnosed with HSV as well as bacterial vaginosis  Prior to that she had already had a conversation for counseling regarding IUD placement with Dr Tiffanie Garcia  Risks and benefits of IUD placement as well as copper IUD were discussed with patient at that time  She feels strongly against any local hormone and would like a copper IUD placed        Past Medical History:   Diagnosis Date    Asthma     Urinary tract infection        Past Surgical History:   Procedure Laterality Date    MULTIPLE TOOTH EXTRACTIONS         Current Outpatient Medications   Medication Sig Dispense Refill    albuterol (PROVENTIL HFA,VENTOLIN HFA) 90 mcg/act inhaler Inhale 2 puffs every 6 (six) hours as needed for wheezing or shortness of breath 1 Inhaler 5    valACYclovir (VALTREX) 1,000 mg tablet Take 1 tablet (1,000 mg total) by mouth 2 (two) times a day for 7 days 14 tablet 0     No current facility-administered medications for this visit  Allergies   Allergen Reactions    Pollen Extract Allergic Rhinitis       Review of Systems   Constitutional: Negative for fatigue  Genitourinary: Negative for difficulty urinating, genital sores, pelvic pain and vaginal discharge  There were no vitals filed for this visit  I spent 20 minutes directly with the patient during this visit    VIRTUAL VISIT DISCLAIMER    Wilbert Lovell acknowledges that she has consented to an online visit or consultation  She understands that the online visit is based solely on information provided by her, and that, in the absence of a face-to-face physical evaluation by the physician, the diagnosis she receives is both limited and provisional in terms of accuracy and completeness  This is not intended to replace a full medical face-to-face evaluation by the physician  Wilbert Lovell understands and accepts these terms  --  Praveen Brooke MD    "This note has been constructed using a voice recognition system  Therefore there may be syntax, spelling, and/or grammatical errors  Please call if you have any questions   All questions and concerns were addressed and answered by myself "

## 2021-07-22 ENCOUNTER — PROCEDURE VISIT (OUTPATIENT)
Dept: FAMILY MEDICINE CLINIC | Facility: CLINIC | Age: 41
End: 2021-07-22
Payer: COMMERCIAL

## 2021-07-22 VITALS
OXYGEN SATURATION: 98 % | HEART RATE: 83 BPM | BODY MASS INDEX: 34.6 KG/M2 | TEMPERATURE: 99.3 F | HEIGHT: 64 IN | RESPIRATION RATE: 18 BRPM | DIASTOLIC BLOOD PRESSURE: 76 MMHG | SYSTOLIC BLOOD PRESSURE: 120 MMHG | WEIGHT: 202.7 LBS

## 2021-07-22 DIAGNOSIS — Z30.430 ENCOUNTER FOR IUD INSERTION: Primary | ICD-10-CM

## 2021-07-22 DIAGNOSIS — Z30.430 ENCOUNTER FOR INSERTION OF INTRAUTERINE CONTRACEPTIVE DEVICE (IUD): ICD-10-CM

## 2021-07-22 LAB — SL AMB POCT URINE HCG: NEGATIVE

## 2021-07-22 PROCEDURE — 81025 URINE PREGNANCY TEST: CPT | Performed by: OBSTETRICS & GYNECOLOGY

## 2021-07-22 PROCEDURE — 58300 INSERT INTRAUTERINE DEVICE: CPT | Performed by: OBSTETRICS & GYNECOLOGY

## 2021-07-22 RX ORDER — IBUPROFEN 400 MG/1
400 TABLET ORAL ONCE
Status: COMPLETED | OUTPATIENT
Start: 2021-07-22 | End: 2021-07-28

## 2021-07-25 NOTE — PROGRESS NOTES
Iud insertions    Date/Time: 7/25/2021 10:37 AM  Performed by: Kera St DO  Authorized by: Kera St DO   [place] Protocol:  Procedure performed by:  Consent: Verbal consent obtained  Written consent obtained  Risks and benefits: risks, benefits and alternatives were discussed  Consent given by: patient  Patient understanding: patient states understanding of the procedure being performed  Patient consent: the patient's understanding of the procedure matches consent given  Procedure consent: procedure consent matches procedure scheduled  Site marked: the operative site was marked      Procedure:     Pelvic exam performed: yes      Negative GC/chlamydia test: no      Negative urine pregnancy test: yes      Negative serum pregnancy test: no      Cervix cleaned and prepped: yes      Speculum placed in vagina: yes      Tenaculum applied to cervix: yes      Uterus sounded: yes      Uterus sound depth (cm):  8    IUD inserted with no complications: yes      IUD type:  ParaGard    Strings trimmed: yes    Post-procedure:     Patient tolerated procedure well: yes      Patient will follow up after next period: yes        Brunilda Tracy was seen today for procedure  Diagnoses and all orders for this visit:    Encounter for IUD insertion  -     ibuprofen (MOTRIN) tablet 400 mg  -     POCT urine HCG    Encounter for insertion of intrauterine contraceptive device (IUD)    Other orders  -     Iud insertions    Brunilda Tracy was seen today for IUD placement  She was advised on risks of the procedure including perforated uterus, bleeding, infection and heavier/irregular periods while IUD in placed  She consented and tolerated the procedure well  She is aware it is effective for up to 10 years  She will return to office in 1 month for string check  All of her questions were answered

## 2021-07-28 RX ADMIN — IBUPROFEN 400 MG: 400 TABLET ORAL at 14:54

## 2021-08-19 ENCOUNTER — OFFICE VISIT (OUTPATIENT)
Dept: FAMILY MEDICINE CLINIC | Facility: CLINIC | Age: 41
End: 2021-08-19
Payer: COMMERCIAL

## 2021-08-19 VITALS
DIASTOLIC BLOOD PRESSURE: 76 MMHG | SYSTOLIC BLOOD PRESSURE: 118 MMHG | BODY MASS INDEX: 35.34 KG/M2 | HEART RATE: 90 BPM | OXYGEN SATURATION: 96 % | WEIGHT: 207 LBS | TEMPERATURE: 98.7 F | HEIGHT: 64 IN | RESPIRATION RATE: 18 BRPM

## 2021-08-19 DIAGNOSIS — Z30.431 IUD CHECK UP: Primary | ICD-10-CM

## 2021-08-19 PROCEDURE — 99213 OFFICE O/P EST LOW 20 MIN: CPT | Performed by: OBSTETRICS & GYNECOLOGY

## 2021-08-19 NOTE — PROGRESS NOTES
Assessment/Plan:      Diagnoses and all orders for this visit:    IUD check up          Subjective:     Patient ID: Nita Black is a 42 y o  female   Who had recent placement of ParaGard IUD on July 22nd  She had no immediate complications at that time  She has been tolerating it well with minimal cramps not requiring ibuprofen and some mild breakthrough bleeding  Today she is please and reports that she would like to continue with the IUD  Review of Systems   Constitutional: Negative for chills, diaphoresis, fatigue and fever  Respiratory: Negative for cough  Gastrointestinal: Negative for abdominal pain  Genitourinary: Negative for menstrual problem, pelvic pain, vaginal bleeding, vaginal discharge and vaginal pain  Skin: Negative for color change  Objective:     Physical Exam  Vitals reviewed  Constitutional:       Appearance: Normal appearance  HENT:      Head: Normocephalic and atraumatic  Mouth/Throat:      Mouth: Mucous membranes are moist    Eyes:      Pupils: Pupils are equal, round, and reactive to light  Pulmonary:      Effort: Pulmonary effort is normal    Genitourinary:     Comments: Normal vulva, vagina, cervix   Both IUD strings visualized  Skin:     General: Skin is warm and dry  Neurological:      Mental Status: She is alert  Mental status is at baseline     Psychiatric:         Mood and Affect: Mood normal          Behavior: Behavior normal

## 2021-12-17 ENCOUNTER — IMMUNIZATIONS (OUTPATIENT)
Dept: FAMILY MEDICINE CLINIC | Facility: HOSPITAL | Age: 41
End: 2021-12-17

## 2021-12-17 DIAGNOSIS — Z23 ENCOUNTER FOR IMMUNIZATION: Primary | ICD-10-CM

## 2021-12-17 PROCEDURE — 0064A COVID-19 MODERNA VACC 0.25 ML BOOSTER: CPT

## 2021-12-17 PROCEDURE — 91306 COVID-19 MODERNA VACC 0.25 ML BOOSTER: CPT

## 2021-12-31 ENCOUNTER — OFFICE VISIT (OUTPATIENT)
Dept: URGENT CARE | Facility: CLINIC | Age: 41
End: 2021-12-31
Payer: COMMERCIAL

## 2021-12-31 VITALS
SYSTOLIC BLOOD PRESSURE: 110 MMHG | HEIGHT: 64 IN | RESPIRATION RATE: 16 BRPM | DIASTOLIC BLOOD PRESSURE: 78 MMHG | BODY MASS INDEX: 35.17 KG/M2 | OXYGEN SATURATION: 99 % | HEART RATE: 77 BPM | TEMPERATURE: 96.9 F | WEIGHT: 206 LBS

## 2021-12-31 DIAGNOSIS — A60.04 HERPES SIMPLEX VULVOVAGINITIS: Primary | ICD-10-CM

## 2021-12-31 PROCEDURE — 99213 OFFICE O/P EST LOW 20 MIN: CPT | Performed by: PHYSICIAN ASSISTANT

## 2021-12-31 RX ORDER — VALACYCLOVIR HYDROCHLORIDE 500 MG/1
500 TABLET, FILM COATED ORAL 2 TIMES DAILY
Qty: 6 TABLET | Refills: 1 | Status: SHIPPED | OUTPATIENT
Start: 2021-12-31 | End: 2022-06-20 | Stop reason: SDUPTHER

## 2022-01-11 RX ORDER — VALACYCLOVIR HYDROCHLORIDE 1 G/1
1000 TABLET, FILM COATED ORAL 2 TIMES DAILY
OUTPATIENT
Start: 2022-01-11

## 2022-05-16 ENCOUNTER — TELEPHONE (OUTPATIENT)
Dept: FAMILY MEDICINE CLINIC | Facility: CLINIC | Age: 42
End: 2022-05-16

## 2022-05-16 DIAGNOSIS — Z12.31 ENCOUNTER FOR SCREENING MAMMOGRAM FOR BREAST CANCER: Primary | ICD-10-CM

## 2022-05-16 NOTE — TELEPHONE ENCOUNTER
Patient is calling because she needs a script for her routine mammogram   [facility] goes to [facility] so please place order in chart

## 2022-05-17 ENCOUNTER — APPOINTMENT (OUTPATIENT)
Dept: RADIOLOGY | Facility: CLINIC | Age: 42
End: 2022-05-17
Payer: COMMERCIAL

## 2022-05-17 ENCOUNTER — OFFICE VISIT (OUTPATIENT)
Dept: URGENT CARE | Facility: CLINIC | Age: 42
End: 2022-05-17
Payer: COMMERCIAL

## 2022-05-17 VITALS
TEMPERATURE: 98.7 F | WEIGHT: 200 LBS | OXYGEN SATURATION: 98 % | HEIGHT: 64 IN | RESPIRATION RATE: 18 BRPM | BODY MASS INDEX: 34.15 KG/M2 | HEART RATE: 82 BPM

## 2022-05-17 DIAGNOSIS — M21.41 PES PLANUS OF BOTH FEET: Primary | ICD-10-CM

## 2022-05-17 DIAGNOSIS — M25.572 ACUTE LEFT ANKLE PAIN: ICD-10-CM

## 2022-05-17 DIAGNOSIS — M21.42 PES PLANUS OF BOTH FEET: Primary | ICD-10-CM

## 2022-05-17 PROCEDURE — 73610 X-RAY EXAM OF ANKLE: CPT

## 2022-05-17 PROCEDURE — 3008F BODY MASS INDEX DOCD: CPT | Performed by: PHYSICIAN ASSISTANT

## 2022-05-17 PROCEDURE — 99213 OFFICE O/P EST LOW 20 MIN: CPT | Performed by: PHYSICIAN ASSISTANT

## 2022-05-17 NOTE — PROGRESS NOTES
[facility]        NAME: Te West is a 36 y o  female  : 1980    MRN: [id number]  DATE: May 17, 2022  TIME: 9:36 AM    Assessment and Plan   Pes planus of both feet [M21 41, M21 42]  1  Pes planus of both feet  XR ankle 3+ vw left    Ambulatory Referral to Physical Therapy   2  Acute left ankle pain  Ambulatory Referral to Physical Therapy         Patient Instructions     Patient Instructions   My interpretation of x-ray is no acute fracture or dislocation, official read is pending  Suspect symptoms of ankle discomfort most likely related to pes planus  Suggested wearing adequate footwear with arch support, can take over-the-counter ibuprofen or Tylenol for discomfort  Provided referral to physical therapy for further evaluation and management  Follow up with PCP in 3-5 days  Proceed to  ER if symptoms worsen  Chief Complaint     Chief Complaint   Patient presents with    Ankle Injury     Pt reports a month ago walking and twisting left ankle          History of Present Illness       Patient is a 69-year-old female presenting today with ankle pain x1 month  Patient notes a little over a month ago she was out on a walk and twisted her left ankle and and inverting manner, notes since that time she has been experiencing intermittent pain discomfort of her left ankle, notes the pain is made worse after long periods of standing or physical activity, notes she has had the same discomfort of her right ankle in the past which was resolved with physical therapy  Has not been taking any medication or alleviating factors for her current symptoms  Denies inability to bear weight, numbness, tingling, bruising, swelling  Review of Systems   Review of Systems   Constitutional: Negative for chills and fever  HENT: Negative for ear pain and sore throat  Eyes: Negative for pain and visual disturbance  Respiratory: Negative for cough and shortness of breath      Cardiovascular: Negative for chest pain and palpitations  Gastrointestinal: Negative for abdominal pain and vomiting  Genitourinary: Negative for dysuria and hematuria  Musculoskeletal:        See HPI   Skin: Negative for color change and rash  Neurological: Negative for seizures and syncope  All other systems reviewed and are negative  Current Medications       Current Outpatient Medications:     albuterol (PROVENTIL HFA,VENTOLIN HFA) 90 mcg/act inhaler, Inhale 2 puffs every 6 (six) hours as needed for wheezing or shortness of breath, Disp: 1 Inhaler, Rfl: 5    valACYclovir (VALTREX) 500 mg tablet, Take 1 tablet (500 mg total) by mouth 2 (two) times a day for 3 days, Disp: 6 tablet, Rfl: 1    Current Allergies     Allergies as of 05/17/2022 - Reviewed 05/17/2022   Allergen Reaction Noted    Pollen extract Allergic Rhinitis 09/19/2019            The following portions of the patient's history were reviewed and updated as appropriate: allergies, current medications, past family history, past medical history, past social history, past surgical history and problem list      Past Medical History:   Diagnosis Date    Asthma     Urinary tract infection        Past Surgical History:   Procedure Laterality Date    MULTIPLE TOOTH EXTRACTIONS         Family History   Problem Relation Age of Onset    Diabetes Father     Diabetes Family     Hypertension Family     Breast cancer Mother 68    Basal cell carcinoma Mother     No Known Problems Daughter     No Known Problems Maternal Aunt     No Known Problems Maternal Aunt     No Known Problems Maternal Aunt     No Known Problems Paternal Aunt     No Known Problems Paternal Aunt     No Known Problems Paternal Aunt     No Known Problems Paternal Aunt     No Known Problems Paternal Aunt          Medications have been verified          Objective   Pulse 82   Temp 98 7 °F (37 1 °C)   Resp 18   Ht 5' 4" (1 626 m)   Wt 90 7 kg (200 lb)   LMP 04/24/2022   SpO2 98%   BMI 34 33 kg/m²        Physical Exam     Physical Exam  Vitals and nursing note reviewed  Constitutional:       General: She is not in acute distress  Appearance: Normal appearance  She is not toxic-appearing  HENT:      Head: Normocephalic and atraumatic  Right Ear: External ear normal       Left Ear: External ear normal    Eyes:      Conjunctiva/sclera: Conjunctivae normal    Cardiovascular:      Rate and Rhythm: Normal rate  Pulses: Normal pulses  Pulmonary:      Effort: Pulmonary effort is normal    Musculoskeletal:      Comments: No obvious deformity of left ankle, no bruising, no swelling, mild TTP of posterior aspect of left lateral malleolus and of dorsum of left foot in line with anterior talofibular ligament, full ROM without in discomfort of left ankle and foot, normal strength of left foot and ankle, neurovascular intact, able to ambulate and bare weight without an antalgic gait   Skin:     General: Skin is warm  Capillary Refill: Capillary refill takes less than 2 seconds  Neurological:      General: No focal deficit present  Mental Status: She is alert and oriented to person, place, and time

## 2022-05-17 NOTE — PATIENT INSTRUCTIONS
My interpretation of x-ray is no acute fracture or dislocation, official read is pending  Suspect symptoms of ankle discomfort most likely related to pes planus  Suggested wearing adequate footwear with arch support, can take over-the-counter ibuprofen or Tylenol for discomfort  Provided referral to physical therapy for further evaluation and management

## 2022-06-01 ENCOUNTER — EVALUATION (OUTPATIENT)
Dept: PHYSICAL THERAPY | Facility: CLINIC | Age: 42
End: 2022-06-01
Payer: COMMERCIAL

## 2022-06-01 ENCOUNTER — TELEPHONE (OUTPATIENT)
Dept: FAMILY MEDICINE CLINIC | Facility: CLINIC | Age: 42
End: 2022-06-01

## 2022-06-01 DIAGNOSIS — M21.42 PES PLANUS OF BOTH FEET: ICD-10-CM

## 2022-06-01 DIAGNOSIS — M21.41 PES PLANUS OF BOTH FEET: ICD-10-CM

## 2022-06-01 DIAGNOSIS — M25.572 ACUTE LEFT ANKLE PAIN: Primary | ICD-10-CM

## 2022-06-01 PROCEDURE — 97161 PT EVAL LOW COMPLEX 20 MIN: CPT

## 2022-06-01 NOTE — PROGRESS NOTES
PT Evaluation     Today's date: 2022  Patient name: Olivia Pelayo  : 1980  MRN: [id number]  Referring provider: Jose Alejandro Bustamante  Dx:   Encounter Diagnosis     ICD-10-CM    1  Acute left ankle pain  M25 572 Ambulatory Referral to Physical Therapy   2  Pes planus of both feet  M21 41 Ambulatory Referral to Physical Therapy    M21 42        Start Time: 1330  Stop Time: 1410  Total time in clinic (min): 40 minutes    Assessment  Assessment details: Olivia Pelayo is a 36y o  year old female reports to physical therapy with symptoms consistent with referring diagnosis of: Acute left ankle pain  (primary encounter diagnosis), Pes planus of both feet (Plan: Ambulatory Referral to Physical Therapy)  Patient demonstrates limitations in L ankle physiologic mobility and functional mobility  Patient is limited in the following functional activities: descending stairs reciprocally, hiking, standing for long periods of time  Patient requires skilled physical therapy to restore prior level of function, address functional limitations, and progress towards independence with home exercise program  Patient was educated on HEP as noted on flow sheet, nature of L ankle sprain, and importance of modalities if pain were to increase  Patient verbalized and demonstrated understanding of HEP and plan of care  Symptom irritability: lowUnderstanding of Dx/Px/POC: excellent  Goals  STGs to be achieved in 4 weeks  -STG 1: Patient will be independent with HEP    -STG 2: Patient will have 0/10 L ankle pain at rest    -STG 3: Patient will be able to perform stair navigation with no increased pain in L ankle  -STG 4: Patient will report 40% functional improvement  LTGs to be achieved in 8 weeks  -LTG 1:  Patient will perform all functional activities with less than 2/10 L ankle pain  -LTG 2:  Patient will improve FOTO score by 10 points  -LTG 3: Patient will be able to hike on uneven terrain with no pain  -LTG 4: Patient will report 80% functional improvement  Plan  Patient would benefit from: PT eval and skilled physical therapy  Planned modality interventions: TENS, thermotherapy: hydrocollator packs, traction, ultrasound and electrical stimulation/Russian stimulation  Planned therapy interventions: joint mobilization, manual therapy, massage, Gonzales taping, neuromuscular re-education, patient education, postural training, activity modification, ADL retraining, ADL training, balance, flexibility, strengthening, stretching, therapeutic activities, therapeutic exercise, therapeutic training, functional ROM exercises, gait training, graded activity, graded exercise, graded motor and home exercise program  Frequency: 2x week  Duration in weeks: 6  Treatment plan discussed with: patient        Subjective Evaluation    History of Present Illness  Mechanism of injury: Patient reports long standing history of B feet pain  She previously had PT for her R ankle  She reports having increased L ankle pain today  Recently, she had increased lumbar spine pain after lifting boxes and "tweaking" it  Patient was wearing heels previously and rolled her ankle 6 weeks ago  She reports her pain is "there" and is "not getting better " Patient had x-rays performed, with no signs of fracture  Functionally, patient reports increased pain with descending stairs reciprocally  Patient reports tightness of R knee     Pain  Current pain ratin  At best pain ratin  At worst pain ratin  Quality: dull ache  Relieving factors: ice, relaxation and rest  Aggravating factors: stair climbing  Progression: no change    Social Support  Steps to enter house: yes  Stairs in house: yes   Lives in: multiple-level home  Lives with: young children    Employment status: working    Diagnostic Tests  X-ray: normal  Treatments  Current treatment: medication  Patient Goals  Patient goals for therapy: decreased pain          Objective Palpation   Left   No palpable tenderness to the posterior tibialis and soleus  Right   No palpable tenderness to the posterior tibialis and soleus  Tenderness   Left Ankle/Foot   Tenderness in the lateral malleolus and talar dome  Right Ankle/Foot   No tenderness  Neurological Testing     Sensation     Ankle/Foot   Left Ankle/Foot   Intact: light touch    Right Ankle/Foot   Intact: light touch     Active Range of Motion   Left Ankle/Foot   Normal active range of motion    Right Ankle/Foot   Normal active range of motion    Passive Range of Motion   Left Ankle/Foot  Normal passive range of motion    Right Ankle/Foot  Normal passive range of motion    Joint Play   Left Ankle/Foot  Hypomobile in the talocrural joint, subtalar joint and forefoot  Right Ankle/Foot  Joints within functional limits are the talocrural joint, subtalar joint and forefoot  Strength/Myotome Testing     Left Ankle/Foot   Normal strength    Right Ankle/Foot   Normal strength    Additional Strength Details  B hip and knee MMT grossly 5/5     General Comments: Ankle/Foot Comments   Moderate limitations in B hamstring and gastrocsoleus complex flexibility              Precautions: history of B ankle sprain  Insurance:  AMA/CMS Eval/ Re-eval POC expires FOTO Auth Status Total   Visits  Start date  Expiration date Extension  Visit limitation? PT only or  PT+OT?  Co-Insurance   [employer] (AMA)  6/1 7/6  No auth required  6/1   [facility] PT $20 Co-pay                                                               Manuals 6/1/22       Ankle DF MWM at step 10       Navicular HVLA  Performed        Cuboid HVLA  Performed        Talocrural distraction Performed                Neuro Re-Ed        Towel scrunch  Rev        Short foot  Rev        SLB no shoes on foam                                        Ther Ex        Gastroc stretch 3x10s B       Soleus stretch  3x10s B       Self ankle DF MWM at step  10 BlkTB        HR eccentric progression 10                                        Ther Activity                        Gait Training                        Modalities

## 2022-06-01 NOTE — TELEPHONE ENCOUNTER
----- Message from James Robertson RN sent at 6/1/2022 11:13 AM EDT -----  Regarding: cpe  Please call patient and schedule her CPE, she was due last month  Thanks!

## 2022-06-16 ENCOUNTER — OFFICE VISIT (OUTPATIENT)
Dept: PHYSICAL THERAPY | Facility: CLINIC | Age: 42
End: 2022-06-16
Payer: COMMERCIAL

## 2022-06-16 DIAGNOSIS — M21.42 PES PLANUS OF BOTH FEET: Primary | ICD-10-CM

## 2022-06-16 DIAGNOSIS — M25.572 ACUTE LEFT ANKLE PAIN: ICD-10-CM

## 2022-06-16 DIAGNOSIS — M21.41 PES PLANUS OF BOTH FEET: Primary | ICD-10-CM

## 2022-06-16 PROCEDURE — 97140 MANUAL THERAPY 1/> REGIONS: CPT

## 2022-06-16 PROCEDURE — 97110 THERAPEUTIC EXERCISES: CPT

## 2022-06-16 NOTE — PROGRESS NOTES
Daily Note     Today's date: 2022  Patient name: Harriet Torres  : 1980  MRN: [id number]  Referring provider: Claritza Bañuelos  Dx:   Encounter Diagnosis     ICD-10-CM    1  Pes planus of both feet  M21 41     M21 42    2  Acute left ankle pain  M25 572        Start Time:   Stop Time: 184  Total time in clinic (min): 30 minutes    Subjective: Patient reports minima L lateral ankle pain today  She continues to note some pain on the lateral aspect of her L ankle when she is walking and performing stair navigation  Objective: See treatment diary below      Assessment: Tolerated treatment well  Fib head and distal L fib mobs improved symptoms  Patient provided with updated HEP  Patient would benefit from continued PT      Plan: Continue per plan of care  Precautions: history of B ankle sprain  Insurance:  [employer]/CMS Eval/ Re-eval POC expires FOTO Auth Status Total   Visits  Start date  Expiration date Extension  Visit limitation? PT only or  PT+OT?  Co-Insurance   [employer] (AMA)    No auth required     [facility] PT $20 Co-pay                                                               Manuals 22      Ankle DF MWM at step 10       Navicular HVLA  Performed  Performed       Cuboid HVLA  Performed  Performed       Talocrural distraction Performed  Performed       Distal, proximal L fib head mobs  Performed       Neuro Re-Ed        Towel scrunch  Rev        Short foot  Rev        SLB no shoes on foam                                        Ther Ex        Gastroc stretch 3x10s B       Soleus stretch  3x10s B       Self ankle DF MWM at step  10 BlkTB  10 BlkTB       HR eccentric progression 10  20      Self fib head mobs   10 with towel                              Ther Activity                        Gait Training                        Modalities

## 2022-06-19 NOTE — PROGRESS NOTES
[facility]    NAME: Blank Jones  AGE: 36 y o  SEX: female  : 1980     DATE: 2022     Assessment and Plan:     Problem List Items Addressed This Visit    None     Visit Diagnoses     Physical exam, annual    -  Primary  Pleasant 48-year-old female presenting today for physical examination  Patient at this time has no acute concern that appears in stable health  Herpes simplex vulvovaginitis        Relevant Medications    valACYclovir (VALTREX) 500 mg tablet    Fatigue, unspecified type        Relevant Orders    CBC and Platelet    Comprehensive metabolic panel    Prediabetes        Relevant Orders    HEMOGLOBIN A1C W/ EAG ESTIMATION    BMI 33 0-33 9,adult        Relevant Orders  BMI Counseling: Body mass index is 33 81 kg/m²  The BMI is above normal  Nutrition recommendations include reducing portion sizes, decreasing overall calorie intake, 3-5 servings of fruits/vegetables daily, reducing fast food intake, consuming healthier snacks, decreasing soda and/or juice intake, moderation in carbohydrate intake, increasing intake of lean protein, reducing intake of saturated fat and trans fat and reducing intake of cholesterol  Exercise recommendations include moderate aerobic physical activity for 150 minutes/week, exercising 3-5 times per week, joining a gym and strength training exercises  HEMOGLOBIN A1C W/ EAG ESTIMATION          Immunizations and preventive care screenings were discussed with patient today  Appropriate education was printed on patient's after visit summary  Counseling:  Alcohol/drug use: discussed moderation in alcohol intake, the recommendations for healthy alcohol use, and avoidance of illicit drug use  Dental Health: discussed importance of regular tooth brushing, flossing, and dental visits    Injury prevention: discussed safety/seat belts, safety helmets, smoke detectors, carbon dioxide detectors, and smoking near bedding or upholstery  Sexual health: discussed sexually transmitted diseases, partner selection, use of condoms, avoidance of unintended pregnancy, and contraceptive alternatives  · Exercise: the importance of regular exercise/physical activity was discussed  Recommend exercise 3-5 times per week for at least 30 minutes  Return in about 2 weeks (around 7/4/2022) for GYN visit / pap smear  Chief Complaint:     Chief Complaint   Patient presents with    Annual Exam     Requesting refill of Valtrex  In PT for left ankle  Mammogram scheduled  Advised to schedule appt for gyn annual/pap      History of Present Illness:     Adult Annual Physical   Patient here for a comprehensive physical exam  The patient reports no problems  Diet and Physical Activity  · Diet/Nutrition: well balanced diet, limited junk food and consuming 3-5 servings of fruits/vegetables daily  · Exercise: walking, strength training exercises and 3-4 times a week on average  Depression Screening  PHQ-2/9 Depression Screening    Little interest or pleasure in doing things: 0 - not at all  Feeling down, depressed, or hopeless: 0 - not at all  PHQ-2 Score: 0  PHQ-2 Interpretation: Negative depression screen       General Health  · Sleep: sleeps well and 4-7 hours of sleep  · Hearing: normal - bilateral   · Vision: most recent eye exam <1 year ago and wears contacts  · Dental: regular dental visits, brushes teeth twice daily and flosses teeth occasionally  /GYN Health  · Patient is: premenopausal  · Last menstrual period:  06/17/2022  · Contraceptive method: IUD placement  Review of Systems:     Review of Systems   Constitutional: Negative  HENT: Negative  Respiratory: Negative  Cardiovascular: Negative  Gastrointestinal: Negative  Genitourinary: Negative  Musculoskeletal: Negative  Neurological: Negative         Past Medical History:     Past Medical History: Diagnosis Date    Asthma     Urinary tract infection       Past Surgical History:     Past Surgical History:   Procedure Laterality Date    MULTIPLE TOOTH EXTRACTIONS        Social History:     Social History     Socioeconomic History    Marital status:      Spouse name: None    Number of children: None    Years of education: None    Highest education level: None   Occupational History    None   Tobacco Use    Smoking status: Never Smoker    Smokeless tobacco: Never Used   Substance and Sexual Activity    Alcohol use: Yes     Comment: socially    Drug use: Never    Sexual activity: None   Other Topics Concern    None   Social History Narrative    None     Social Determinants of Health     Financial Resource Strain: Not on file   Food Insecurity: Not on file   Transportation Needs: Not on file   Physical Activity: Not on file   Stress: Not on file   Social Connections: Not on file   Intimate Partner Violence: Not on file   Housing Stability: Not on file      Family History:     Family History   Problem Relation Age of Onset    Diabetes Father     Diabetes Family     Hypertension Family     Breast cancer Mother 68    Basal cell carcinoma Mother     No Known Problems Daughter     No Known Problems Maternal Aunt     No Known Problems Maternal Aunt     No Known Problems Maternal Aunt     No Known Problems Paternal Aunt     No Known Problems Paternal Aunt     No Known Problems Paternal Aunt     No Known Problems Paternal Aunt     No Known Problems Paternal Aunt       Current Medications:     Current Outpatient Medications   Medication Sig Dispense Refill    albuterol (PROVENTIL HFA,VENTOLIN HFA) 90 mcg/act inhaler Inhale 2 puffs every 6 (six) hours as needed for wheezing or shortness of breath 1 Inhaler 5    valACYclovir (VALTREX) 500 mg tablet Take 1 tablet (500 mg total) by mouth 2 (two) times a day for 3 days 6 tablet 1     No current facility-administered medications for this visit  Allergies: Allergies   Allergen Reactions    Pollen Extract Allergic Rhinitis      Physical Exam:     /64 (BP Location: Left arm, Patient Position: Sitting, Cuff Size: Adult)   Pulse 100   Temp 98 8 °F (37 1 °C) (Tympanic)   Resp 16   Ht 5' 4" (1 626 m)   Wt 89 4 kg (197 lb)   LMP 06/17/2022 (Exact Date)   SpO2 97%   BMI 33 81 kg/m²     Physical Exam  Vitals reviewed  Constitutional:       General: She is not in acute distress  Appearance: Normal appearance  She is obese  She is not ill-appearing, toxic-appearing or diaphoretic  HENT:      Head: Normocephalic and atraumatic  Right Ear: Tympanic membrane, ear canal and external ear normal       Left Ear: Tympanic membrane, ear canal and external ear normal       Nose: Nose normal  No congestion or rhinorrhea  Mouth/Throat:      Mouth: Mucous membranes are moist       Pharynx: Oropharynx is clear  No oropharyngeal exudate or posterior oropharyngeal erythema  Eyes:      General:         Right eye: No discharge  Left eye: No discharge  Extraocular Movements: Extraocular movements intact  Conjunctiva/sclera: Conjunctivae normal       Pupils: Pupils are equal, round, and reactive to light  Cardiovascular:      Rate and Rhythm: Normal rate and regular rhythm  Pulses: Normal pulses  Heart sounds: Normal heart sounds  No murmur heard  No gallop  Pulmonary:      Effort: Pulmonary effort is normal       Breath sounds: No wheezing, rhonchi or rales  Abdominal:      General: Abdomen is flat  Bowel sounds are normal  There is no distension  Palpations: Abdomen is soft  Tenderness: There is no abdominal tenderness  There is no right CVA tenderness, left CVA tenderness or guarding  Musculoskeletal:         General: Normal range of motion  Cervical back: Normal range of motion and neck supple  No rigidity  Right lower leg: No edema  Left lower leg: No edema  Lymphadenopathy:      Cervical: No cervical adenopathy  Skin:     General: Skin is warm  Capillary Refill: Capillary refill takes less than 2 seconds  Neurological:      Mental Status: She is alert and oriented to person, place, and time  Mental status is at baseline            Sofia Perez DO  [address]

## 2022-06-20 ENCOUNTER — OFFICE VISIT (OUTPATIENT)
Dept: FAMILY MEDICINE CLINIC | Facility: CLINIC | Age: 42
End: 2022-06-20
Payer: COMMERCIAL

## 2022-06-20 VITALS
BODY MASS INDEX: 33.63 KG/M2 | RESPIRATION RATE: 16 BRPM | TEMPERATURE: 98.8 F | WEIGHT: 197 LBS | DIASTOLIC BLOOD PRESSURE: 64 MMHG | HEIGHT: 64 IN | OXYGEN SATURATION: 97 % | SYSTOLIC BLOOD PRESSURE: 118 MMHG | HEART RATE: 100 BPM

## 2022-06-20 DIAGNOSIS — R73.03 PREDIABETES: ICD-10-CM

## 2022-06-20 DIAGNOSIS — R53.83 FATIGUE, UNSPECIFIED TYPE: ICD-10-CM

## 2022-06-20 DIAGNOSIS — A60.04 HERPES SIMPLEX VULVOVAGINITIS: ICD-10-CM

## 2022-06-20 DIAGNOSIS — Z00.00 PHYSICAL EXAM, ANNUAL: Primary | ICD-10-CM

## 2022-06-20 PROCEDURE — 99396 PREV VISIT EST AGE 40-64: CPT | Performed by: FAMILY MEDICINE

## 2022-06-20 PROCEDURE — 3008F BODY MASS INDEX DOCD: CPT | Performed by: FAMILY MEDICINE

## 2022-06-20 PROCEDURE — 3725F SCREEN DEPRESSION PERFORMED: CPT | Performed by: FAMILY MEDICINE

## 2022-06-20 RX ORDER — VALACYCLOVIR HYDROCHLORIDE 1 G/1
1000 TABLET, FILM COATED ORAL 2 TIMES DAILY
Status: CANCELLED | OUTPATIENT
Start: 2022-06-20

## 2022-06-20 RX ORDER — VALACYCLOVIR HYDROCHLORIDE 500 MG/1
500 TABLET, FILM COATED ORAL 2 TIMES DAILY
Qty: 6 TABLET | Refills: 1 | Status: SHIPPED | OUTPATIENT
Start: 2022-06-20 | End: 2022-07-05

## 2022-06-28 ENCOUNTER — HOSPITAL ENCOUNTER (OUTPATIENT)
Dept: RADIOLOGY | Facility: HOSPITAL | Age: 42
Discharge: HOME | End: 2022-06-28
Payer: COMMERCIAL

## 2022-06-28 ENCOUNTER — OFFICE VISIT (OUTPATIENT)
Dept: PHYSICAL THERAPY | Facility: CLINIC | Age: 42
End: 2022-06-28
Payer: COMMERCIAL

## 2022-06-28 VITALS — HEIGHT: 64 IN | WEIGHT: 197 LBS | BODY MASS INDEX: 33.63 KG/M2

## 2022-06-28 DIAGNOSIS — Z12.31 ENCOUNTER FOR SCREENING MAMMOGRAM FOR BREAST CANCER: ICD-10-CM

## 2022-06-28 DIAGNOSIS — M21.42 PES PLANUS OF BOTH FEET: Primary | ICD-10-CM

## 2022-06-28 DIAGNOSIS — M21.41 PES PLANUS OF BOTH FEET: Primary | ICD-10-CM

## 2022-06-28 DIAGNOSIS — M25.572 ACUTE LEFT ANKLE PAIN: ICD-10-CM

## 2022-06-28 PROCEDURE — 77067 SCR MAMMO BI INCL CAD: CPT

## 2022-06-28 PROCEDURE — 97140 MANUAL THERAPY 1/> REGIONS: CPT

## 2022-06-28 PROCEDURE — 97110 THERAPEUTIC EXERCISES: CPT

## 2022-06-28 PROCEDURE — 77063 BREAST TOMOSYNTHESIS BI: CPT

## 2022-06-28 NOTE — PROGRESS NOTES
Daily Note     Today's date: 2022  Patient name: Alejandra Galeana  : 1980  MRN: [id number]  Referring provider: Mery Street  Dx:   Encounter Diagnosis     ICD-10-CM    1  Pes planus of both feet  M21 41     M21 42    2  Acute left ankle pain  M25 572        Start Time: 0800  Stop Time: 0845  Total time in clinic (min): 45 minutes    Subjective: Patient denies pain of her L foot today  She continues to note slight pain when she is perform heel raises and when she is wearing heels  Objective: See treatment diary below      Assessment: Tolerated treatment well  Patient is progressing well  Discussed with patient HEP to continue strengthening B ankles to improve balance and decrease risk for reinjury  Will D/C patient after 30 days if patient does not call for return visit  Patient would benefit from continued PT      Plan: Continue per plan of care  Precautions: history of B ankle sprain  Insurance:  [employer]/CMS Eval/ Re-eval POC expires FOTO Auth Status Total   Visits  Start date  Expiration date Extension  Visit limitation? PT only or  PT+OT?  Co-Insurance   [employer] (AMA)    No auth required     [facility] PT $20 Co-pay                                                               Manuals 22     Ankle DF MWM at step 10  20     Navicular HVLA  Performed  Performed  Performed      Cuboid HVLA  Performed  Performed  Performed      Talocrural distraction Performed  Performed  Performed      Distal, proximal L fib head mobs  Performed  Performed      Neuro Re-Ed        Towel scrunch  Rev        Short foot  Rev        SLB no shoes on foam   5x15s B                                      Ther Ex        Gastroc stretch 3x10s B  Pro stretch 10x10s      Soleus stretch  3x10s B  3x10s B     Self ankle DF MWM at step  10 BlkTB  10 BlkTB  With PT assist 2x10 B      HR eccentric progression 10  20 20 B only      Self fib head mobs   10 with towel      Side stepping with TB around feet    4x15' light loop                     Ther Activity                        Gait Training                        Modalities

## 2022-07-05 ENCOUNTER — ANNUAL EXAM (OUTPATIENT)
Dept: FAMILY MEDICINE CLINIC | Facility: CLINIC | Age: 42
End: 2022-07-05
Payer: COMMERCIAL

## 2022-07-05 VITALS
RESPIRATION RATE: 16 BRPM | WEIGHT: 196.8 LBS | DIASTOLIC BLOOD PRESSURE: 82 MMHG | SYSTOLIC BLOOD PRESSURE: 126 MMHG | HEART RATE: 72 BPM | HEIGHT: 64 IN | BODY MASS INDEX: 33.6 KG/M2 | TEMPERATURE: 97.5 F | OXYGEN SATURATION: 98 %

## 2022-07-05 DIAGNOSIS — Z12.4 SCREENING FOR CERVICAL CANCER: Primary | ICD-10-CM

## 2022-07-05 DIAGNOSIS — N64.89 BREAST ASYMMETRY: ICD-10-CM

## 2022-07-05 PROCEDURE — G0145 SCR C/V CYTO,THINLAYER,RESCR: HCPCS | Performed by: STUDENT IN AN ORGANIZED HEALTH CARE EDUCATION/TRAINING PROGRAM

## 2022-07-05 PROCEDURE — 99396 PREV VISIT EST AGE 40-64: CPT | Performed by: OBSTETRICS & GYNECOLOGY

## 2022-07-05 PROCEDURE — G0476 HPV COMBO ASSAY CA SCREEN: HCPCS | Performed by: STUDENT IN AN ORGANIZED HEALTH CARE EDUCATION/TRAINING PROGRAM

## 2022-07-05 NOTE — PROGRESS NOTES
Patient is to be discharged from formal PT intervention, as she has met all her goals and denies pain of her L ankle  Provided patient with comprehensive HEP on last scheduled visit

## 2022-07-06 LAB
HPV HR 12 DNA CVX QL NAA+PROBE: NEGATIVE
HPV16 DNA CVX QL NAA+PROBE: NEGATIVE
HPV18 DNA CVX QL NAA+PROBE: NEGATIVE

## 2022-07-07 NOTE — PROGRESS NOTES
Sandrita Ramírez is a 36 y o  female here for a routine exam   Current complaints: none       Gynecologic History  Patient's last menstrual period was 2022 (exact date)  Last Pap: 2017   Results were: normal  Last mammogram: 2022  Results were: abnormal    Obstetric History  OB History    Para Term  AB Living   1 1 1         SAB IAB Ectopic Multiple Live Births                  # Outcome Date GA Lbr Zak/2nd Weight Sex Delivery Anes PTL Lv   1 Term                  The following portions of the patient's history were reviewed and updated as appropriate: allergies, current medications, past family history, past medical history, past social history, past surgical history and problem list     Review of Systems  Constitutional: negative  Respiratory: negative  Cardiovascular: negative  Gastrointestinal: negative  Musculoskeletal:negative      Objective     /82 (BP Location: Left arm, Patient Position: Sitting, Cuff Size: Adult)   Pulse 72   Temp 97 5 °F (36 4 °C) (Tympanic)   Resp 16   Ht 5' 4" (1 626 m)   Wt 89 3 kg (196 lb 12 8 oz)   LMP 2022 (Exact Date)   SpO2 98%   BMI 33 78 kg/m²     General Appearance:    Alert, cooperative, no distress, appears stated age   Head:    Normocephalic, without obvious abnormality, atraumatic   Eyes:    PERRL, conjunctiva/corneas clear, EOM's intact, fundi     benign, both eyes   Breast Exam:    No tenderness, masses, or nipple abnormality       Genitalia:    Normal female without lesion, discharge or tenderness   Rectal:    Normal tone, no masses or tenderness; guaiac negative stool     Assessment     Healthy female exam    1  Screening for cervical cancer  - Liquid-based pap, screening ([facility])  - HPV High Risk    2  Breast asymmetry  - US breast left limited (diagnostic);  Future  - Mammo diagnostic left w 3d & cad; Future

## 2022-07-11 LAB
LAB AP GYN PRIMARY INTERPRETATION: NORMAL
LAB AP LMP: NORMAL
Lab: NORMAL
PATH INTERP SPEC-IMP: NORMAL

## 2022-07-20 ENCOUNTER — HOSPITAL ENCOUNTER (OUTPATIENT)
Dept: RADIOLOGY | Facility: HOSPITAL | Age: 42
Discharge: HOME | End: 2022-07-20
Payer: COMMERCIAL

## 2022-07-20 DIAGNOSIS — N64.89 BREAST ASYMMETRY: ICD-10-CM

## 2022-07-20 PROCEDURE — G0279 TOMOSYNTHESIS, MAMMO: HCPCS

## 2022-07-20 PROCEDURE — 76642 ULTRASOUND BREAST LIMITED: CPT

## 2022-07-20 PROCEDURE — 77065 DX MAMMO INCL CAD UNI: CPT

## 2022-09-29 DIAGNOSIS — A60.04 HERPES SIMPLEX VULVOVAGINITIS: ICD-10-CM

## 2022-09-29 RX ORDER — VALACYCLOVIR HYDROCHLORIDE 500 MG/1
500 TABLET, FILM COATED ORAL 2 TIMES DAILY
Qty: 6 TABLET | Refills: 1 | Status: SHIPPED | OUTPATIENT
Start: 2022-09-29 | End: 2022-10-02

## 2023-06-02 ENCOUNTER — OFFICE VISIT (OUTPATIENT)
Dept: URGENT CARE | Facility: CLINIC | Age: 43
End: 2023-06-02

## 2023-06-02 ENCOUNTER — APPOINTMENT (OUTPATIENT)
Dept: RADIOLOGY | Facility: CLINIC | Age: 43
End: 2023-06-02
Payer: COMMERCIAL

## 2023-06-02 VITALS
OXYGEN SATURATION: 99 % | DIASTOLIC BLOOD PRESSURE: 74 MMHG | WEIGHT: 195 LBS | SYSTOLIC BLOOD PRESSURE: 139 MMHG | RESPIRATION RATE: 20 BRPM | BODY MASS INDEX: 33.29 KG/M2 | HEART RATE: 80 BPM | TEMPERATURE: 96.9 F | HEIGHT: 64 IN

## 2023-06-02 DIAGNOSIS — S83.281A ACUTE LATERAL MENISCUS TEAR OF RIGHT KNEE, INITIAL ENCOUNTER: Primary | ICD-10-CM

## 2023-06-02 DIAGNOSIS — S89.91XA KNEE INJURY, RIGHT, INITIAL ENCOUNTER: ICD-10-CM

## 2023-06-02 PROCEDURE — 73564 X-RAY EXAM KNEE 4 OR MORE: CPT

## 2023-06-02 NOTE — PROGRESS NOTES
[facility]        NAME: Abdoul Palomo is a 39 y o  female  : 1980    MRN: [id number]  DATE: 2023  TIME: 3:09 PM    Assessment and Plan   Acute lateral meniscus tear of right knee, initial encounter [S83 281A]  1  Acute lateral meniscus tear of right knee, initial encounter  XR knee 4+ vw right injury    Ambulatory Referral to Orthopedic Surgery            Patient Instructions     Likely lateral meniscus tear  Negative right knee xray  Hinged knee brace given  Follow up with Ortho in 3-5 days  Proceed to  ER if symptoms worsen  Chief Complaint     Chief Complaint   Patient presents with   • Knee Injury     Pt reports of right knee pain from an injury occurred last night after using knee to pivot while kicking with left leg  History of Present Illness       Knee Pain   The incident occurred 12 to 24 hours ago  The incident occurred at the gym  The injury mechanism was a twisting injury  The pain is present in the right knee  The quality of the pain is described as stabbing  The pain is moderate  The pain has been constant since onset  Associated symptoms include an inability to bear weight  Pertinent negatives include no loss of motion or numbness  She reports no foreign bodies present  The symptoms are aggravated by weight bearing (pivotting )  She has tried rest for the symptoms  Review of Systems   Review of Systems   Constitutional: Negative for chills, fatigue and fever  HENT: Negative for postnasal drip and sore throat  Respiratory: Negative for cough and shortness of breath  Cardiovascular: Negative for chest pain and palpitations  Gastrointestinal: Negative for abdominal pain, nausea and vomiting  Genitourinary: Negative for dysuria  Musculoskeletal: Positive for gait problem  Negative for joint swelling  Skin: Negative for rash  Neurological: Negative for dizziness, syncope, light-headedness, numbness and headaches     Psychiatric/Behavioral: "Negative for agitation and confusion  All other systems reviewed and are negative  Current Medications       Current Outpatient Medications:   •  albuterol (PROVENTIL HFA,VENTOLIN HFA) 90 mcg/act inhaler, Inhale 2 puffs every 6 (six) hours as needed for wheezing or shortness of breath, Disp: 1 Inhaler, Rfl: 5  •  valACYclovir (VALTREX) 500 mg tablet, Take 1 tablet (500 mg total) by mouth 2 (two) times a day for 3 days, Disp: 6 tablet, Rfl: 1    Current Allergies     Allergies as of 06/02/2023 - Reviewed 06/02/2023   Allergen Reaction Noted   • Pollen extract Allergic Rhinitis 09/19/2019            The following portions of the patient's history were reviewed and updated as appropriate: allergies, current medications, past family history, past medical history, past social history, past surgical history and problem list      Past Medical History:   Diagnosis Date   • Asthma    • Urinary tract infection        Past Surgical History:   Procedure Laterality Date   • MULTIPLE TOOTH EXTRACTIONS         Family History   Problem Relation Age of Onset   • Breast cancer Mother 68   • Basal cell carcinoma Mother    • Diabetes Father    • No Known Problems Daughter    • No Known Problems Maternal Aunt    • No Known Problems Maternal Aunt    • No Known Problems Maternal Aunt    • No Known Problems Paternal Aunt    • No Known Problems Paternal Aunt    • No Known Problems Paternal Aunt    • No Known Problems Paternal Aunt    • No Known Problems Paternal Aunt    • Diabetes Family    • Hypertension Family          Medications have been verified  Objective   /74   Pulse 80   Temp (!) 96 9 °F (36 1 °C)   Resp 20   Ht 5' 4\" (1 626 m)   Wt 88 5 kg (195 lb)   SpO2 99%   BMI 33 47 kg/m²   No LMP recorded  Physical Exam     Physical Exam  Vitals reviewed  Constitutional:       General: She is not in acute distress  Appearance: Normal appearance  She is not ill-appearing     HENT:      Head: " Normocephalic and atraumatic  Eyes:      Extraocular Movements: Extraocular movements intact  Conjunctiva/sclera: Conjunctivae normal    Musculoskeletal:      Cervical back: Normal range of motion  Right knee: No swelling, effusion or bony tenderness  Normal range of motion  No LCL laxity, MCL laxity, ACL laxity or PCL laxity  Abnormal meniscus  Instability Tests: Anterior drawer test negative  Posterior drawer test negative  Anterior Lachman test negative  Lateral Ebenezer test positive  Medial Ebenezer test negative  Skin:     General: Skin is warm  Neurological:      General: No focal deficit present  Mental Status: She is alert  Psychiatric:         Mood and Affect: Mood normal          Behavior: Behavior normal          Judgment: Judgment normal             Right Knee Xray: Negative for bony abnormalities

## 2023-06-02 NOTE — PATIENT INSTRUCTIONS
Meniscus Tear   AMBULATORY CARE:   A meniscus tear  is a tear in the cartilage of your knee  The meniscus is a piece of cartilage (strong tissue) between your thighbone and shinbone  The meniscus helps to cushion your knee joint and keep it stable  Call your local emergency number (911 in the [address]) if:   Your arm or leg feels warm, tender, and painful  It may look swollen and red  Seek care immediately if:   You cannot move your knee at all  Call your doctor if:   Your symptoms do not improve with treatment  You have questions or concerns about your condition or care  Common symptoms include the following:   A pop or tear when the injury happens    Pain and swelling    Tenderness    Stiffness    Popping, catching, or locking of your knee    Not being able to extend your knee fully    Treatment for a meniscus tear  depends on the type of tear you have  Some types of meniscus tears can heal on their own  You may need any of the following:  NSAIDs , such as ibuprofen, help decrease swelling, pain, and fever  This medicine is available with or without a doctor's order  NSAIDs can cause stomach bleeding or kidney problems in certain people  If you take blood thinner medicine, always ask if NSAIDs are safe for you  Always read the medicine label and follow directions  Do not give these medicines to children younger than 6 months without direction from a healthcare provider  Rest  your knee  Avoid activities that make the swelling or pain worse  You may need to avoid putting weight on your leg while you have pain  Your healthcare provider may recommend that you use crutches  Apply ice  on your knee for 15 to 20 minutes every hour or as directed  Use an ice pack, or put crushed ice in a plastic bag  Cover it with a towel  Ice helps prevent tissue damage and decreases swelling and pain  Compress  your knee with an elastic bandage, air cast, medical boot, or splint to reduce swelling   Ask your healthcare provider which compression device to use, and how tight it should be  Elevate  your knee above the level of your heart as often as you can  This will help decrease swelling and pain  Prop your knee on pillows or blankets to keep it elevated comfortably  Surgery  may be needed if your symptoms do not improve  Your healthcare provider may trim away or repair damaged tissue  Follow up with your doctor as directed:  Write down your questions so you remember to ask them during your visits  © Copyright [employer] 2022 Information is for End User's use only and may not be sold, redistributed or otherwise used for commercial purposes  The above information is an  only  It is not intended as medical advice for individual conditions or treatments  Talk to your doctor, nurse or pharmacist before following any medical regimen to see if it is safe and effective for you

## 2023-06-25 PROBLEM — E66.9 OBESE: Status: ACTIVE | Noted: 2017-01-23

## 2023-06-26 ENCOUNTER — OFFICE VISIT (OUTPATIENT)
Dept: FAMILY MEDICINE CLINIC | Facility: CLINIC | Age: 43
End: 2023-06-26
Payer: COMMERCIAL

## 2023-06-26 VITALS
RESPIRATION RATE: 16 BRPM | SYSTOLIC BLOOD PRESSURE: 96 MMHG | HEART RATE: 79 BPM | WEIGHT: 199.1 LBS | BODY MASS INDEX: 33.99 KG/M2 | OXYGEN SATURATION: 97 % | DIASTOLIC BLOOD PRESSURE: 72 MMHG | HEIGHT: 64 IN

## 2023-06-26 DIAGNOSIS — Z00.00 ANNUAL PHYSICAL EXAM: Primary | ICD-10-CM

## 2023-06-26 DIAGNOSIS — A60.04 HERPES SIMPLEX VULVOVAGINITIS: ICD-10-CM

## 2023-06-26 DIAGNOSIS — Z12.31 ENCOUNTER FOR SCREENING MAMMOGRAM FOR BREAST CANCER: ICD-10-CM

## 2023-06-26 PROBLEM — Z80.3 FH: BREAST CANCER IN FIRST DEGREE RELATIVE: Status: RESOLVED | Noted: 2018-02-15 | Resolved: 2023-06-26

## 2023-06-26 PROBLEM — Z20.2 POSSIBLE EXPOSURE TO STD: Status: RESOLVED | Noted: 2019-08-14 | Resolved: 2023-06-26

## 2023-06-26 PROBLEM — Z00.01 ENCOUNTER FOR GENERAL ADULT MEDICAL EXAMINATION WITH ABNORMAL FINDINGS: Status: RESOLVED | Noted: 2019-08-15 | Resolved: 2023-06-26

## 2023-06-26 PROBLEM — Z30.09 ENCOUNTER FOR COUNSELING REGARDING CONTRACEPTION: Status: RESOLVED | Noted: 2021-06-21 | Resolved: 2023-06-26

## 2023-06-26 PROBLEM — D22.30 CHANGE IN FACIAL MOLE: Status: RESOLVED | Noted: 2018-07-20 | Resolved: 2023-06-26

## 2023-06-26 PROBLEM — Z30.431 IUD CHECK UP: Status: RESOLVED | Noted: 2021-08-19 | Resolved: 2023-06-26

## 2023-06-26 PROCEDURE — 99396 PREV VISIT EST AGE 40-64: CPT | Performed by: FAMILY MEDICINE

## 2023-06-26 RX ORDER — VALACYCLOVIR HYDROCHLORIDE 500 MG/1
500 TABLET, FILM COATED ORAL 2 TIMES DAILY
Qty: 6 TABLET | Refills: 1 | Status: SHIPPED | OUTPATIENT
Start: 2023-06-26 | End: 2023-06-29

## 2023-06-26 NOTE — PROGRESS NOTES
"  [facility]    NAME: Bernardino Parent  AGE: 39 y o  SEX: female  : 1980     DATE: 2023     Assessment and Plan:     Problem List Items Addressed This Visit        Genitourinary    Herpes simplex vulvovaginitis     - Patient requested refill to have supply for future outbreaks  - Advised to use Lysine 500 mg daily  - No new outbreaks         Relevant Medications    valACYclovir (VALTREX) 500 mg tablet       Other    Encounter for screening mammogram for breast cancer     - Mammogram on 22 showed \"decreased conspicuity of the asymmetry in left posterior breast\" suspected to be benign with repeat mammogram in 6 months  Ultrasound negative  - Repeat mammogram ordered today         Relevant Orders    Mammo screening bilateral w 3d & cad    BMI 34 0-34 9,adult    Relevant Orders    CBC and differential    Comprehensive metabolic panel    HEMOGLOBIN A1C W/ EAG ESTIMATION    Lipid Panel with Direct LDL reflex    TSH, 3rd generation with Free T4 reflex   Other Visit Diagnoses     Annual physical exam    -  Primary    Relevant Orders    CBC and differential    Comprehensive metabolic panel    HEMOGLOBIN A1C W/ EAG ESTIMATION    Lipid Panel with Direct LDL reflex    TSH, 3rd generation with Free T4 reflex        Immunizations and preventive care screenings were discussed with patient today  Appropriate education was printed on patient's after visit summary  Counseling:  Alcohol/drug use: discussed moderation in alcohol intake, the recommendations for healthy alcohol use, and avoidance of illicit drug use  Injury prevention: discussed safety/seat belts, safety helmets, smoke detectors, carbon dioxide detectors, and smoking near bedding or upholstery  Sexual health: discussed sexually transmitted diseases, partner selection, use of condoms, avoidance of unintended pregnancy, and contraceptive alternatives    Exercise: the importance " of regular exercise/physical activity was discussed  Recommend exercise 3-5 times per week for at least 30 minutes  BMI Counseling: Body mass index is 34 18 kg/m²  The BMI is above normal  Nutrition recommendations include decreasing fast food intake, consuming healthier snacks and limiting drinks that contain sugar  Exercise recommendations include exercising 3-5 times per week  Rationale for BMI follow-up plan is due to patient being overweight or obese  Depression Screening and Follow-up Plan: Patient was screened for depression during today's encounter  They screened negative with a PHQ-2 score of 0  Return in about 1 year (around 6/26/2024) for Annual physical      Chief Complaint:     Chief Complaint   Patient presents with   • Annual Exam     Yearly physical  No concerns  History of Present Illness:     Adult Annual Physical   Patient here for a comprehensive physical exam  The patient reports no problems  Albuterol only as needed when cleaning house  Requesting refill of Valtrex for outbreaks of vaginal hsv    Diet and Physical Activity  Diet/Nutrition: improving  Trying to be better with salads, fruits and vegetables  Exercise: walking  Tad shanice do  Works from home, tries to go for walk around a block  Recent right meniscus injury, healing after brace    BMI Counseling: Body mass index is 34 18 kg/m²  The BMI is above normal  Nutrition recommendations include reducing portion sizes, 3-5 servings of fruits/vegetables daily, reducing fast food intake and decreasing soda and/or juice intake  Depression Screening  PHQ-2/9 Depression Screening    Little interest or pleasure in doing things: 0 - not at all  Feeling down, depressed, or hopeless: 0 - not at all  PHQ-2 Score: 0  PHQ-2 Interpretation: Negative depression screen       General Health  Sleep: gets 7-8 hours of sleep on average  Hearing: normal - bilateral   Vision: no vision problems  Contacts  Dental: regular dental visits  /GYN Health  Patient is: premenopausal  Last menstrual period: 6/15/23  Contraceptive method: IUD placement  Review of Systems:     Review of Systems   Constitutional: Negative for fever  HENT: Negative for sore throat  Eyes: Negative for visual disturbance  Respiratory: Negative for cough and shortness of breath  Cardiovascular: Negative for chest pain  Gastrointestinal: Negative for abdominal pain, nausea and vomiting  Genitourinary: Negative for dysuria and hematuria  Musculoskeletal: Negative for back pain  Skin: Negative for rash  Neurological: Negative for dizziness and headaches  All other systems reviewed and are negative       Past Medical History:     Past Medical History:   Diagnosis Date   • Asthma    • FH: breast cancer in first degree relative 2/15/2018   • Urinary tract infection       Past Surgical History:     Past Surgical History:   Procedure Laterality Date   • MULTIPLE TOOTH EXTRACTIONS        Social History:     Social History     Socioeconomic History   • Marital status:      Spouse name: None   • Number of children: None   • Years of education: None   • Highest education level: None   Occupational History   • None   Tobacco Use   • Smoking status: Never   • Smokeless tobacco: Never   Vaping Use   • Vaping Use: Never used   Substance and Sexual Activity   • Alcohol use: Yes     Comment: socially   • Drug use: Never   • Sexual activity: None   Other Topics Concern   • None   Social History Narrative   • None     Social Determinants of Health     Financial Resource Strain: Not on file   Food Insecurity: Not on file   Transportation Needs: Not on file   Physical Activity: Not on file   Stress: Not on file   Social Connections: Not on file   Intimate Partner Violence: Not on file   Housing Stability: Not on file      Family History:     Family History   Problem Relation Age of Onset   • Breast cancer Mother 68   • Basal cell carcinoma Mother    • Diabetes "Father         Blood clot in neck   • No Known Problems Daughter    • No Known Problems Maternal Aunt    • No Known Problems Maternal Aunt    • No Known Problems Maternal Aunt    • No Known Problems Paternal Aunt    • No Known Problems Paternal Aunt    • No Known Problems Paternal Aunt    • No Known Problems Paternal Aunt    • No Known Problems Paternal Aunt    • Diabetes Family    • Hypertension Family       Current Medications:     Current Outpatient Medications   Medication Sig Dispense Refill   • valACYclovir (VALTREX) 500 mg tablet Take 1 tablet (500 mg total) by mouth 2 (two) times a day for 3 days 6 tablet 1   • albuterol (PROVENTIL HFA,VENTOLIN HFA) 90 mcg/act inhaler Inhale 2 puffs every 6 (six) hours as needed for wheezing or shortness of breath 1 Inhaler 5     No current facility-administered medications for this visit  Allergies: Allergies   Allergen Reactions   • Pollen Extract Allergic Rhinitis      Physical Exam:     BP 96/72 (BP Location: Left arm, Patient Position: Sitting, Cuff Size: Large)   Pulse 79   Resp 16   Ht 5' 4\" (1 626 m)   Wt 90 3 kg (199 lb 1 6 oz)   LMP 06/15/2023 (Exact Date)   SpO2 97%   BMI 34 18 kg/m²     Physical Exam  Vitals reviewed  Constitutional:       Appearance: She is obese  HENT:      Head: Normocephalic  Right Ear: External ear normal       Left Ear: External ear normal       Mouth/Throat:      Mouth: Mucous membranes are moist       Pharynx: No oropharyngeal exudate or posterior oropharyngeal erythema  Eyes:      Extraocular Movements: Extraocular movements intact  Conjunctiva/sclera: Conjunctivae normal    Cardiovascular:      Rate and Rhythm: Normal rate and regular rhythm  Pulses: Normal pulses  Heart sounds: Normal heart sounds  No murmur heard  Pulmonary:      Effort: No respiratory distress  Breath sounds: Normal breath sounds  No wheezing  Abdominal:      General: There is no distension        Palpations: Abdomen " is soft  Tenderness: There is no abdominal tenderness  There is no guarding  Skin:     General: Skin is warm and dry  Capillary Refill: Capillary refill takes less than 2 seconds  Neurological:      Mental Status: She is alert  Motor: No weakness        Gait: Gait normal    Psychiatric:         Mood and Affect: Mood normal           Rosa Smith MD  [address] [place] [address]

## 2023-06-26 NOTE — ASSESSMENT & PLAN NOTE
"- Mammogram on 6/28/22 showed \"decreased conspicuity of the asymmetry in left posterior breast\" suspected to be benign with repeat mammogram in 6 months   Ultrasound negative  - Repeat mammogram ordered today  "

## 2023-06-28 ENCOUNTER — IMMUNIZATIONS (OUTPATIENT)
Dept: FAMILY MEDICINE CLINIC | Facility: CLINIC | Age: 43
End: 2023-06-28
Payer: COMMERCIAL

## 2023-06-28 DIAGNOSIS — Z23 ENCOUNTER FOR IMMUNIZATION: Primary | ICD-10-CM

## 2023-06-28 PROCEDURE — 0124A PR IMM ADMN SARSCOV2 BIVALENT 30 MCG/0.3 ML BST: CPT

## 2023-06-28 PROCEDURE — 91312 PR SARSCOV2 VACCINE BIVALENT 30 MCG/0.3 ML IM USE: CPT

## 2023-08-17 DIAGNOSIS — A60.04 HERPES SIMPLEX VULVOVAGINITIS: ICD-10-CM

## 2023-08-18 RX ORDER — VALACYCLOVIR HYDROCHLORIDE 500 MG/1
500 TABLET, FILM COATED ORAL 2 TIMES DAILY
Qty: 6 TABLET | Refills: 0 | Status: SHIPPED | OUTPATIENT
Start: 2023-08-18 | End: 2023-08-21

## 2023-09-05 ENCOUNTER — PROCEDURE VISIT (OUTPATIENT)
Dept: FAMILY MEDICINE CLINIC | Facility: CLINIC | Age: 43
End: 2023-09-05
Payer: COMMERCIAL

## 2023-09-05 VITALS
TEMPERATURE: 98.6 F | OXYGEN SATURATION: 99 % | DIASTOLIC BLOOD PRESSURE: 70 MMHG | HEART RATE: 75 BPM | WEIGHT: 196.5 LBS | BODY MASS INDEX: 33.55 KG/M2 | RESPIRATION RATE: 21 BRPM | SYSTOLIC BLOOD PRESSURE: 116 MMHG | HEIGHT: 64 IN

## 2023-09-05 DIAGNOSIS — Z30.432 ENCOUNTER FOR IUD REMOVAL: Primary | ICD-10-CM

## 2023-09-05 PROCEDURE — 58301 REMOVE INTRAUTERINE DEVICE: CPT | Performed by: OBSTETRICS & GYNECOLOGY

## 2023-09-05 NOTE — PROGRESS NOTES
Patient had copper IUD inserted in July 2021 at [facility]. 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 However she had recently taken the bar exam and not opposed to the idea of getting pregnant. Patient states that the abdominal cramps has also gotten worse with the IUD and would like it removed. Discussed the increased risks of aneuploidy in patients who are pregnant above the age of 28. Recommended patient to take prenatal vitamins a few months prior to conceiving to help reduce the risk of spina bifida. 1. Encounter for IUD removal  -     Iud removal        Iud removal    Date/Time: 9/5/2023 10:00 AM    Performed by: Nancy Motta MD  Authorized by: Nawaf Mendoza MD  [facility] Protocol:  Procedure performed by: Justin Leiva)  Consent: Verbal consent obtained. Written consent obtained. Risks and benefits: risks, benefits and alternatives were discussed  Consent given by: patient  Patient understanding: patient states understanding of the procedure being performed  Patient consent: the patient's understanding of the procedure matches consent given  Procedure consent: procedure consent matches procedure scheduled  Patient identity confirmed: verbally with patient      Procedure:     Removed with no complications: yes      Removal due to mechanical complications of IUD: no      Removal due to infection and inflammatory reaction: no      Other reason for removal:  Abdominal cramp  Comments:      IUD string at the cervical os visualized using a speculum. Grasped the IUD string using a ringed forcep and applied traction away from the cervix. IUD removed without complications.

## 2023-09-22 ENCOUNTER — OFFICE VISIT (OUTPATIENT)
Dept: URGENT CARE | Facility: CLINIC | Age: 43
End: 2023-09-22
Payer: COMMERCIAL

## 2023-09-22 VITALS
TEMPERATURE: 97.1 F | WEIGHT: 194.2 LBS | DIASTOLIC BLOOD PRESSURE: 80 MMHG | RESPIRATION RATE: 17 BRPM | OXYGEN SATURATION: 100 % | BODY MASS INDEX: 33.16 KG/M2 | HEIGHT: 64 IN | SYSTOLIC BLOOD PRESSURE: 110 MMHG | HEART RATE: 87 BPM

## 2023-09-22 DIAGNOSIS — B00.9 HERPES SIMPLEX INFECTION OF SKIN: Primary | ICD-10-CM

## 2023-09-22 PROCEDURE — 99213 OFFICE O/P EST LOW 20 MIN: CPT | Performed by: PHYSICIAN ASSISTANT

## 2023-09-22 RX ORDER — VALACYCLOVIR HYDROCHLORIDE 500 MG/1
500 TABLET, FILM COATED ORAL 2 TIMES DAILY
Qty: 6 TABLET | Refills: 0 | Status: SHIPPED | OUTPATIENT
Start: 2023-09-22 | End: 2023-09-25

## 2023-09-22 RX ORDER — CLOBETASOL PROPIONATE 0.5 MG/G
CREAM TOPICAL 2 TIMES DAILY
Qty: 30 G | Refills: 0 | Status: SHIPPED | OUTPATIENT
Start: 2023-09-22

## 2023-09-22 NOTE — PATIENT INSTRUCTIONS
Vesicular Rash:   -We discussed that the ddx is HSV outbreak which is an atypical location but does clinically presents similarly. Will treat with Valtrex 500mg PO BID x 3 days. The patient will also apply clobetasol topically as instructed. Keep the lesions clean, dry and covered as discussed. Follow up with PCP for worsening or persistent sx.

## 2023-09-22 NOTE — PROGRESS NOTES
[facility]        NAME: Gerardo Drake is a 39 y.o. female  : 1980    MRN: [id number]  DATE: 2023  TIME: 8:53 AM    Assessment and Plan   Herpes simplex infection of skin [B00.9]  1. Herpes simplex infection of skin  valACYclovir (VALTREX) 500 mg tablet    clobetasol (TEMOVATE) 0.05 % cream            Patient Instructions     Vesicular Rash:   -We discussed that the ddx is HSV outbreak which is an atypical location but does clinically presents similarly. Will treat with Valtrex 500mg PO BID x 3 days. The patient will also apply clobetasol topically as instructed. Keep the lesions clean, dry and covered as discussed. Follow up with PCP for worsening or persistent sx. Follow up with PCP in 3-5 days. Proceed to  ER if symptoms worsen. Chief Complaint     Chief Complaint   Patient presents with   • Rash     Rash, itchy,cluster of  visicle on the left lower arm (closed to wrist) which started Wednesday and started to spread. Applied alcohol on the affected area. History of Present Illness       The patient presents today for a rash of the left upper extremity x 2 days. She states that the lesions are vesicular and have been worsening as the week has gone on. She states that the lesions are highly pruritic and have been uncomfortable. The vesicles then burst and there was serosanguineous drainage which crusted over. Yesterday she noted that a new "cluster" of lesions started adjacent to the original rash. No fever or chills. No URI sx or sick contacts. No recent travel. No new medications, detergents, jewelery, lotions. No facial swelling or trouble swallowing. She is HSV2 positive and her partner is HSV 1 positive. Review of Systems   Review of Systems   Constitutional: Negative for activity change, appetite change, chills, diaphoresis, fatigue and fever.    HENT: Negative for congestion, ear discharge, ear pain, facial swelling, hearing loss, postnasal drip, rhinorrhea, sinus pressure, sinus pain, sore throat, tinnitus, trouble swallowing and voice change. Eyes: Negative for visual disturbance. Respiratory: Negative for apnea, cough, chest tightness, shortness of breath, wheezing and stridor. Cardiovascular: Negative for chest pain, palpitations and leg swelling. Gastrointestinal: Negative for abdominal distention and abdominal pain. Genitourinary: Negative for decreased urine volume. Musculoskeletal: Negative for arthralgias, joint swelling, myalgias, neck pain and neck stiffness. Skin: Positive for rash. Allergic/Immunologic: Negative for immunocompromised state. Neurological: Negative for dizziness, weakness, light-headedness, numbness and headaches. Hematological: Negative for adenopathy.          Current Medications       Current Outpatient Medications:   •  albuterol (PROVENTIL HFA,VENTOLIN HFA) 90 mcg/act inhaler, Inhale 2 puffs every 6 (six) hours as needed for wheezing or shortness of breath, Disp: 1 Inhaler, Rfl: 5  •  clobetasol (TEMOVATE) 0.05 % cream, Apply topically 2 (two) times a day, Disp: 30 g, Rfl: 0  •  valACYclovir (VALTREX) 500 mg tablet, Take 1 tablet (500 mg total) by mouth 2 (two) times a day for 3 days, Disp: 6 tablet, Rfl: 0  •  valACYclovir (VALTREX) 500 mg tablet, Take 1 tablet (500 mg total) by mouth 2 (two) times a day for 3 days, Disp: 6 tablet, Rfl: 0    Current Allergies     Allergies as of 09/22/2023 - Reviewed 09/22/2023   Allergen Reaction Noted   • Pollen extract Allergic Rhinitis 09/19/2019            The following portions of the patient's history were reviewed and updated as appropriate: allergies, current medications, past family history, past medical history, past social history, past surgical history and problem list.     Past Medical History:   Diagnosis Date   • Asthma    • FH: breast cancer in first degree relative 2/15/2018   • Urinary tract infection        Past Surgical History:   Procedure Laterality Date   • MULTIPLE TOOTH EXTRACTIONS         Family History   Problem Relation Age of Onset   • Breast cancer Mother 68   • Basal cell carcinoma Mother    • Diabetes Father         Blood clot in neck   • No Known Problems Daughter    • No Known Problems Maternal Aunt    • No Known Problems Maternal Aunt    • No Known Problems Maternal Aunt    • No Known Problems Paternal Aunt    • No Known Problems Paternal Aunt    • No Known Problems Paternal Aunt    • No Known Problems Paternal Aunt    • No Known Problems Paternal Aunt    • Diabetes Family    • Hypertension Family          Medications have been verified. Objective   /80   Pulse 87   Temp (!) 97.1 °F (36.2 °C)   Resp 17   Ht 5' 4" (1.626 m)   Wt 88.1 kg (194 lb 3.2 oz)   LMP 08/13/2023 (Exact Date)   SpO2 100%   BMI 33.33 kg/m²   Patient's last menstrual period was 08/13/2023 (exact date). Physical Exam     Physical Exam  Vitals and nursing note reviewed. Constitutional:       General: She is not in acute distress. Appearance: She is well-developed. She is not diaphoretic. HENT:      Mouth/Throat:      Pharynx: Uvula midline. Cardiovascular:      Rate and Rhythm: Normal rate and regular rhythm. Pulses: Normal pulses. Heart sounds: Normal heart sounds, S1 normal and S2 normal. No murmur heard. Pulmonary:      Effort: Pulmonary effort is normal. No tachypnea, accessory muscle usage or respiratory distress. Breath sounds: Normal breath sounds. No stridor. No decreased breath sounds, wheezing, rhonchi or rales. Skin:     Capillary Refill: Capillary refill takes less than 2 seconds. Findings: Rash present. Rash is vesicular (There is an erythematous, vesicular, maculopapular rash that is oozing a serosanginous drainage on the flexor aspect of the wrist, there is a cluster that is eschared over and a newer cluster with vesicular lesions. ).

## 2023-10-04 LAB
ALBUMIN SERPL-MCNC: 4.3 G/DL (ref 3.9–4.9)
ALBUMIN/GLOB SERPL: 1.5 {RATIO} (ref 1.2–2.2)
ALP SERPL-CCNC: 43 IU/L (ref 44–121)
ALT SERPL-CCNC: 24 IU/L (ref 0–32)
AST SERPL-CCNC: 21 IU/L (ref 0–40)
BASOPHILS # BLD AUTO: 0 X10E3/UL (ref 0–0.2)
BASOPHILS NFR BLD AUTO: 1 %
BILIRUB SERPL-MCNC: 0.5 MG/DL (ref 0–1.2)
BUN SERPL-MCNC: 12 MG/DL (ref 6–24)
BUN/CREAT SERPL: 12 (ref 9–23)
CALCIUM SERPL-MCNC: 9.1 MG/DL (ref 8.7–10.2)
CHLORIDE SERPL-SCNC: 106 MMOL/L (ref 96–106)
CHOLEST SERPL-MCNC: 168 MG/DL (ref 100–199)
CO2 SERPL-SCNC: 23 MMOL/L (ref 20–29)
CREAT SERPL-MCNC: 0.97 MG/DL (ref 0.57–1)
EGFR: 74 ML/MIN/1.73
EOSINOPHIL # BLD AUTO: 0.3 X10E3/UL (ref 0–0.4)
EOSINOPHIL NFR BLD AUTO: 5 %
ERYTHROCYTE [DISTWIDTH] IN BLOOD BY AUTOMATED COUNT: 12.8 % (ref 11.7–15.4)
EST. AVERAGE GLUCOSE BLD GHB EST-MCNC: 103 MG/DL
GLOBULIN SER-MCNC: 2.9 G/DL (ref 1.5–4.5)
GLUCOSE SERPL-MCNC: 92 MG/DL (ref 70–99)
HBA1C MFR BLD: 5.2 % (ref 4.8–5.6)
HCT VFR BLD AUTO: 43 % (ref 34–46.6)
HDLC SERPL-MCNC: 50 MG/DL
HGB BLD-MCNC: 14.4 G/DL (ref 11.1–15.9)
IMM GRANULOCYTES # BLD: 0 X10E3/UL (ref 0–0.1)
IMM GRANULOCYTES NFR BLD: 0 %
LDLC SERPL CALC-MCNC: 99 MG/DL (ref 0–99)
LYMPHOCYTES # BLD AUTO: 2.2 X10E3/UL (ref 0.7–3.1)
LYMPHOCYTES NFR BLD AUTO: 35 %
MCH RBC QN AUTO: 32.1 PG (ref 26.6–33)
MCHC RBC AUTO-ENTMCNC: 33.5 G/DL (ref 31.5–35.7)
MCV RBC AUTO: 96 FL (ref 79–97)
MONOCYTES # BLD AUTO: 0.5 X10E3/UL (ref 0.1–0.9)
MONOCYTES NFR BLD AUTO: 8 %
NEUTROPHILS # BLD AUTO: 3.2 X10E3/UL (ref 1.4–7)
NEUTROPHILS NFR BLD AUTO: 51 %
PLATELET # BLD AUTO: 319 X10E3/UL (ref 150–450)
POTASSIUM SERPL-SCNC: 4.6 MMOL/L (ref 3.5–5.2)
PROT SERPL-MCNC: 7.2 G/DL (ref 6–8.5)
RBC # BLD AUTO: 4.48 X10E6/UL (ref 3.77–5.28)
SODIUM SERPL-SCNC: 140 MMOL/L (ref 134–144)
TRIGL SERPL-MCNC: 105 MG/DL (ref 0–149)
TSH SERPL DL<=0.005 MIU/L-ACNC: 0.74 UIU/ML (ref 0.45–4.5)
WBC # BLD AUTO: 6.2 X10E3/UL (ref 3.4–10.8)

## 2023-10-11 ENCOUNTER — HOSPITAL ENCOUNTER (OUTPATIENT)
Dept: RADIOLOGY | Facility: HOSPITAL | Age: 43
Discharge: HOME | End: 2023-10-11
Payer: COMMERCIAL

## 2023-10-11 DIAGNOSIS — Z87.898 H/O ABNORMAL MAMMOGRAM: ICD-10-CM

## 2023-10-11 PROCEDURE — G0279 TOMOSYNTHESIS, MAMMO: HCPCS

## 2023-10-11 PROCEDURE — 77066 DX MAMMO INCL CAD BI: CPT

## 2023-11-24 ENCOUNTER — PATIENT MESSAGE (OUTPATIENT)
Dept: FAMILY MEDICINE CLINIC | Facility: CLINIC | Age: 43
End: 2023-11-24

## 2023-11-24 DIAGNOSIS — A60.04 HERPES SIMPLEX VULVOVAGINITIS: ICD-10-CM

## 2023-11-24 RX ORDER — VALACYCLOVIR HYDROCHLORIDE 500 MG/1
500 TABLET, FILM COATED ORAL 2 TIMES DAILY
Qty: 6 TABLET | Refills: 0 | Status: SHIPPED | OUTPATIENT
Start: 2023-11-24 | End: 2023-11-27

## 2023-11-24 NOTE — TELEPHONE ENCOUNTER
----- Message from Celia Finnegan sent at 11/24/2023  3:23 PM EST -----  Regarding: Prescription refill request  Contact: [phone]  Hello. Please may I have a refill of my Prevacid for the following medication? I have an outbreak coming on.  valACYclovir 500 mg tablet  Commonly known as: VALTREX    Thank you.

## 2024-01-10 ENCOUNTER — OFFICE VISIT (OUTPATIENT)
Dept: URGENT CARE | Facility: CLINIC | Age: 44
End: 2024-01-10
Payer: COMMERCIAL

## 2024-01-10 VITALS
BODY MASS INDEX: 33.9 KG/M2 | HEIGHT: 64 IN | TEMPERATURE: 97.5 F | OXYGEN SATURATION: 98 % | SYSTOLIC BLOOD PRESSURE: 118 MMHG | DIASTOLIC BLOOD PRESSURE: 78 MMHG | RESPIRATION RATE: 18 BRPM | WEIGHT: 198.6 LBS | HEART RATE: 85 BPM

## 2024-01-10 DIAGNOSIS — J20.8 ACUTE VIRAL BRONCHITIS: Primary | ICD-10-CM

## 2024-01-10 PROCEDURE — 99213 OFFICE O/P EST LOW 20 MIN: CPT | Performed by: FAMILY MEDICINE

## 2024-01-10 RX ORDER — FLUTICASONE PROPIONATE 50 MCG
1 SPRAY, SUSPENSION (ML) NASAL DAILY
Qty: 9.9 ML | Refills: 0 | Status: SHIPPED | OUTPATIENT
Start: 2024-01-10

## 2024-01-10 RX ORDER — ALBUTEROL SULFATE 90 UG/1
2 AEROSOL, METERED RESPIRATORY (INHALATION) EVERY 6 HOURS PRN
Qty: 18 G | Refills: 0 | Status: SHIPPED | OUTPATIENT
Start: 2024-01-10

## 2024-01-10 RX ORDER — METHYLPREDNISOLONE 4 MG/1
TABLET ORAL
Qty: 21 TABLET | Refills: 0 | Status: SHIPPED | OUTPATIENT
Start: 2024-01-10

## 2024-01-10 NOTE — PATIENT INSTRUCTIONS
- Medrol dose pack prescribed, to be completed as directed  - Albuterol inhaler prescribed, to be used as needed for chest tightness/wheezing/shortness of breath   - take Mucinex DM as needed for cough   - Flonase nasal spray prescribed, use as directed to help w/ sinus symptoms and post-nasal drip  - rest and drink plenty of fluids  - take Tylenol or Motrin as needed   - run a humidifier at home   - follow up w/ PCP for re-check in 3-5 days   - if symptoms persist despite treatment, worsen, or any new symptoms present, patient is to be seen in the ER.

## 2024-01-10 NOTE — PROGRESS NOTES
[facility]        NAME: Joi Ragsdale is a 43 y.o. female  : 1980    MRN: [id number]  DATE: January 10, 2024  TIME: 9:55 AM    Assessment and Plan   Acute viral bronchitis [J20.8]  1. Acute viral bronchitis  methylPREDNISolone 4 MG tablet therapy pack    albuterol (Ventolin HFA) 90 mcg/act inhaler    fluticasone (FLONASE) 50 mcg/act nasal spray            Patient Instructions     Patient Instructions   - Medrol dose pack prescribed, to be completed as directed  - Albuterol inhaler prescribed, to be used as needed for chest tightness/wheezing/shortness of breath   - take Mucinex DM as needed for cough   - Flonase nasal spray prescribed, use as directed to help w/ sinus symptoms and post-nasal drip  - rest and drink plenty of fluids  - take Tylenol or Motrin as needed   - run a humidifier at home   - follow up w/ PCP for re-check in 3-5 days   - if symptoms persist despite treatment, worsen, or any new symptoms present, patient is to be seen in the ER.     Follow up with PCP in 3-5 days.  Proceed to  ER if symptoms worsen.    Chief Complaint     Chief Complaint   Patient presents with    Cold Like Symptoms     Pt ill x 1 week, cough, chest tightness, started with sinus pressure and post nasal drip, Pt used old albuterol inhaler and Sudafed. No covid test done.         History of Present Illness       44 yo female presents c/o chest congestion and mildly productive cough x 1 week. She states she had sinus pressure, nasal congestion, and post-nasal drip initially, however those symptoms are resolved for the most part. No fever/chills. No headache or body aches. No chest pain or SOB, but she has felt chest tightness and wheezing. Patient is not a smoker. No GI sx. No skin rashes. No loss of taste or smell. No recent travel or known exposure to anyone with COVID-19. Patient has received the COVID-19 vaccine. She has no known medication allergies. She states she has a diagnosis of mild asthma, and she  has an Albuterol inhaler at home which she used last night for the chest tightness and wheezing, however she states the inhaler  in . No treatment attempted for the cough.      Review of Systems   Review of Systems   Constitutional: Negative.    HENT:          As noted in HPI   Eyes: Negative.    Respiratory:          As noted in HPI   Cardiovascular: Negative.    Gastrointestinal: Negative.    Musculoskeletal: Negative.    Skin: Negative.    Allergic/Immunologic:        Pollen   Neurological: Negative.    Hematological: Negative.          Current Medications       Current Outpatient Medications:     albuterol (PROVENTIL HFA,VENTOLIN HFA) 90 mcg/act inhaler, Inhale 2 puffs every 6 (six) hours as needed for wheezing or shortness of breath, Disp: 1 Inhaler, Rfl: 5    albuterol (Ventolin HFA) 90 mcg/act inhaler, Inhale 2 puffs every 6 (six) hours as needed for wheezing or shortness of breath, Disp: 18 g, Rfl: 0    fluticasone (FLONASE) 50 mcg/act nasal spray, 1 spray into each nostril daily, Disp: 9.9 mL, Rfl: 0    methylPREDNISolone 4 MG tablet therapy pack, Use as directed on package, Disp: 21 tablet, Rfl: 0    valACYclovir (VALTREX) 500 mg tablet, Take 1 tablet (500 mg total) by mouth 2 (two) times a day for 3 days, Disp: 6 tablet, Rfl: 0    Current Allergies     Allergies as of 01/10/2024 - Reviewed 01/10/2024   Allergen Reaction Noted    Pollen extract Allergic Rhinitis 2019            The following portions of the patient's history were reviewed and updated as appropriate: allergies, current medications, past family history, past medical history, past social history, past surgical history and problem list.     Past Medical History:   Diagnosis Date    Asthma     FH: breast cancer in first degree relative 2/15/2018    Urinary tract infection        Past Surgical History:   Procedure Laterality Date    MULTIPLE TOOTH EXTRACTIONS         Family History   Problem Relation Age of Onset    Breast  "cancer Mother 73    Basal cell carcinoma Mother     Diabetes Father         Blood clot in neck    No Known Problems Daughter     No Known Problems Maternal Aunt     No Known Problems Maternal Aunt     No Known Problems Maternal Aunt     No Known Problems Paternal Aunt     No Known Problems Paternal Aunt     No Known Problems Paternal Aunt     No Known Problems Paternal Aunt     No Known Problems Paternal Aunt     Diabetes Family     Hypertension Family          Medications have been verified.        Objective   /78   Pulse 85   Temp 97.5 °F (36.4 °C)   Resp 18   Ht 5' 4\" (1.626 m)   Wt 90.1 kg (198 lb 9.6 oz)   SpO2 98%   BMI 34.09 kg/m²   No LMP recorded.       Physical Exam     Physical Exam  Vitals and nursing note reviewed.   Constitutional:       General: She is awake. She is not in acute distress.     Appearance: Normal appearance. She is well-developed and well-groomed. She is not ill-appearing, toxic-appearing or diaphoretic.   HENT:      Head: Normocephalic and atraumatic.      Jaw: There is normal jaw occlusion.      Right Ear: Tympanic membrane, ear canal and external ear normal.      Left Ear: Tympanic membrane, ear canal and external ear normal.      Nose: Nose normal.      Mouth/Throat:      Lips: Pink. No lesions.      Mouth: Mucous membranes are moist.      Pharynx: Oropharynx is clear. Uvula midline.   Eyes:      General: Lids are normal.      Conjunctiva/sclera: Conjunctivae normal.   Neck:      Trachea: Trachea and phonation normal.   Cardiovascular:      Rate and Rhythm: Normal rate and regular rhythm.      Pulses: Normal pulses.      Heart sounds: Normal heart sounds.   Pulmonary:      Effort: Pulmonary effort is normal. No tachypnea, accessory muscle usage or respiratory distress.      Breath sounds: Normal air entry. Wheezing present.      Comments: Mild expiratory wheezing in bilateral lungs  Musculoskeletal:      Cervical back: Neck supple. No edema, erythema, rigidity or " tenderness.   Lymphadenopathy:      Cervical: No cervical adenopathy.   Skin:     General: Skin is warm and dry.      Capillary Refill: Capillary refill takes less than 2 seconds.      Coloration: Skin is not pale.   Neurological:      Mental Status: She is alert and oriented to person, place, and time. Mental status is at baseline.   Psychiatric:         Mood and Affect: Mood normal.         Behavior: Behavior normal. Behavior is cooperative.         Thought Content: Thought content normal.         Judgment: Judgment normal.

## 2024-06-21 DIAGNOSIS — A60.04 HERPES SIMPLEX VULVOVAGINITIS: ICD-10-CM

## 2024-06-21 RX ORDER — VALACYCLOVIR HYDROCHLORIDE 500 MG/1
500 TABLET, FILM COATED ORAL 2 TIMES DAILY
Qty: 6 TABLET | Refills: 0 | Status: SHIPPED | OUTPATIENT
Start: 2024-06-21 | End: 2024-06-24

## 2024-07-25 ENCOUNTER — RA CDI HCC (OUTPATIENT)
Dept: OTHER | Facility: HOSPITAL | Age: 44
End: 2024-07-25

## 2024-07-25 NOTE — PROGRESS NOTES
HCC coding opportunities       Chart reviewed, no opportunity found: CHART REVIEWED, NO OPPORTUNITY FOUND        Patients Insurance        Commercial Insurance: [employer]

## 2024-07-31 NOTE — PROGRESS NOTES
Adult Annual Physical  Name: Joi Ragsdale      : 1980      MRN: [id number]  Encounter Provider: Gamal Carrasquillo MD  Encounter Date: 2024   Encounter department: [facility]    Assessment & Plan   1. Annual physical exam  2. Encounter for screening mammogram for breast cancer  3. Abnormal mammogram  -     Mammo diagnostic left w 3d & cad; Future; Expected date: 10/12/2024  4. BMI 34.0-34.9,adult  -     CBC and differential; Future; Expected date: 10/15/2024  -     Comprehensive metabolic panel; Future; Expected date: 10/15/2024  -     Lipid Panel with Direct LDL reflex; Future; Expected date: 10/15/2024  5. Herpes simplex vulvovaginitis  Assessment & Plan:  Chronic, stable  - Patient reminded about supplement Lysine 500 mg discussed at previous visit. Reprinted into AVS  - Will refill one time Valtrex to have for future outbreak  Orders:  -     valACYclovir (VALTREX) 500 mg tablet; Take 1 tablet (500 mg total) by mouth 2 (two) times a day for 3 days  6. Colon cancer screening declined    Immunizations and preventive care screenings were discussed with patient today. Appropriate education was printed on patient's after visit summary.    Counseling:  Alcohol/drug use: discussed moderation in alcohol intake, the recommendations for healthy alcohol use, and avoidance of illicit drug use.  Dental Health: discussed importance of regular tooth brushing, flossing, and dental visits.  Injury prevention: discussed safety/seat belts, safety helmets, smoke detectors, carbon dioxide detectors, and smoking near bedding or upholstery.  Sexual health: discussed sexually transmitted diseases, partner selection, use of condoms, avoidance of unintended pregnancy, and contraceptive alternatives.  Exercise: the importance of regular exercise/physical activity was discussed. Recommend exercise 3-5 times per week for at least 30 minutes.     BMI Counseling: Body mass index is 34.57 kg/m².  The BMI is above normal. Nutrition recommendations include decreasing portion sizes, encouraging healthy choices of fruits and vegetables, consuming healthier snacks, limiting drinks that contain sugar and moderation in carbohydrate intake. Exercise recommendations include exercising 3-5 times per week. Rationale for BMI follow-up plan is due to patient being overweight or obese.     Depression Screening and Follow-up Plan: Patient was screened for depression during today's encounter. They screened negative with a PHQ-2 score of 0.      History of Present Illness       Doing well overall, some joint pains but doing [employer] and home stretching exercises. Aware of 6 month follow up for mammogram, but would like to wait for 1 year interval    Adult Annual Physical:  Patient presents for annual physical.     Depression Screening:  - PHQ-2 Score: 0    General Health:  - Sleep: 7-8 hours of sleep on average.  - Hearing: normal hearing bilateral ears.  - Vision: no vision problems.  - Dental: no dental visits for > 1 year.    /GYN Health:  - Follows with GYN: yes.   - Menopause: premenopausal.   - Last menstrual cycle: 7/3/2024.     Review of Systems   Constitutional:  Negative for fever.   HENT:  Negative for sore throat.    Respiratory:  Negative for shortness of breath.    Cardiovascular:  Negative for chest pain.   Gastrointestinal:  Negative for abdominal pain, nausea and vomiting.   Genitourinary:  Negative for dysuria.   Musculoskeletal:  Positive for arthralgias.   Skin:  Negative for rash.   Neurological:  Negative for dizziness.   All other systems reviewed and are negative.    Medical History Reviewed by provider this encounter:  Tobacco  Allergies  Meds  Problems  Med Hx  Surg Hx  Fam Hx       Current Outpatient Medications on File Prior to Visit   Medication Sig Dispense Refill   • albuterol (PROVENTIL HFA,VENTOLIN HFA) 90 mcg/act inhaler Inhale 2 puffs every 6 (six) hours as needed for wheezing or  "shortness of breath 1 Inhaler 5   • albuterol (Ventolin HFA) 90 mcg/act inhaler Inhale 2 puffs every 6 (six) hours as needed for wheezing or shortness of breath 18 g 0   • fluticasone (FLONASE) 50 mcg/act nasal spray 1 spray into each nostril daily 9.9 mL 0   • methylPREDNISolone 4 MG tablet therapy pack Use as directed on package (Patient not taking: Reported on 8/1/2024) 21 tablet 0   • [DISCONTINUED] valACYclovir (VALTREX) 500 mg tablet Take 1 tablet (500 mg total) by mouth 2 (two) times a day for 3 days 6 tablet 0     No current facility-administered medications on file prior to visit.      Social History     Tobacco Use   • Smoking status: Never   • Smokeless tobacco: Never   Vaping Use   • Vaping status: Never Used   Substance and Sexual Activity   • Alcohol use: Yes     Comment: rarely   • Drug use: Never   • Sexual activity: Yes       Objective     /80 (BP Location: Left arm, Patient Position: Sitting, Cuff Size: Standard)   Pulse 78   Temp 97.8 °F (36.6 °C) (Tympanic)   Ht 5' 4\" (1.626 m)   Wt 91.4 kg (201 lb 6.4 oz)   SpO2 98%   BMI 34.57 kg/m²     Physical Exam  Vitals reviewed.   Constitutional:       General: She is not in acute distress.     Appearance: Normal appearance. She is not ill-appearing.   HENT:      Head: Normocephalic.      Right Ear: External ear normal.      Left Ear: External ear normal.      Mouth/Throat:      Mouth: Mucous membranes are moist.   Eyes:      Extraocular Movements: Extraocular movements intact.      Conjunctiva/sclera: Conjunctivae normal.   Cardiovascular:      Rate and Rhythm: Normal rate and regular rhythm.      Pulses: Normal pulses.      Heart sounds: Normal heart sounds. No murmur heard.  Pulmonary:      Effort: No respiratory distress.      Breath sounds: Normal breath sounds. No wheezing.   Abdominal:      General: There is no distension.      Palpations: Abdomen is soft.      Tenderness: There is no abdominal tenderness. There is no guarding. "   Musculoskeletal:         General: No swelling or tenderness.   Skin:     General: Skin is warm and dry.   Neurological:      Mental Status: She is alert.      Motor: No weakness.      Gait: Gait normal.   Psychiatric:         Mood and Affect: Mood normal.

## 2024-08-01 ENCOUNTER — OFFICE VISIT (OUTPATIENT)
Age: 44
End: 2024-08-01

## 2024-08-01 VITALS
TEMPERATURE: 97.8 F | HEIGHT: 64 IN | SYSTOLIC BLOOD PRESSURE: 119 MMHG | OXYGEN SATURATION: 98 % | DIASTOLIC BLOOD PRESSURE: 80 MMHG | WEIGHT: 201.4 LBS | HEART RATE: 78 BPM | BODY MASS INDEX: 34.38 KG/M2

## 2024-08-01 DIAGNOSIS — Z12.31 ENCOUNTER FOR SCREENING MAMMOGRAM FOR BREAST CANCER: ICD-10-CM

## 2024-08-01 DIAGNOSIS — Z00.00 ANNUAL PHYSICAL EXAM: Primary | ICD-10-CM

## 2024-08-01 DIAGNOSIS — Z53.20 COLON CANCER SCREENING DECLINED: ICD-10-CM

## 2024-08-01 DIAGNOSIS — R92.8 ABNORMAL MAMMOGRAM: ICD-10-CM

## 2024-08-01 DIAGNOSIS — A60.04 HERPES SIMPLEX VULVOVAGINITIS: ICD-10-CM

## 2024-08-01 PROCEDURE — 99386 PREV VISIT NEW AGE 40-64: CPT | Performed by: FAMILY MEDICINE

## 2024-08-01 RX ORDER — VALACYCLOVIR HYDROCHLORIDE 500 MG/1
500 TABLET, FILM COATED ORAL 2 TIMES DAILY
Qty: 6 TABLET | Refills: 0 | Status: SHIPPED | OUTPATIENT
Start: 2024-08-01 | End: 2024-08-04

## 2024-08-01 NOTE — ASSESSMENT & PLAN NOTE
Chronic, stable  - Patient reminded about supplement Lysine 500 mg discussed at previous visit. Reprinted into AVS  - Will refill one time Valtrex to have for future outbreak

## 2024-08-08 ENCOUNTER — OFFICE VISIT (OUTPATIENT)
Dept: URGENT CARE | Facility: CLINIC | Age: 44
End: 2024-08-08
Payer: COMMERCIAL

## 2024-08-08 ENCOUNTER — APPOINTMENT (OUTPATIENT)
Dept: RADIOLOGY | Facility: CLINIC | Age: 44
End: 2024-08-08
Payer: COMMERCIAL

## 2024-08-08 VITALS
RESPIRATION RATE: 16 BRPM | HEIGHT: 64 IN | SYSTOLIC BLOOD PRESSURE: 137 MMHG | BODY MASS INDEX: 33.97 KG/M2 | DIASTOLIC BLOOD PRESSURE: 75 MMHG | HEART RATE: 70 BPM | WEIGHT: 199 LBS | OXYGEN SATURATION: 98 % | TEMPERATURE: 98.4 F

## 2024-08-08 DIAGNOSIS — M79.671 RIGHT FOOT PAIN: Primary | ICD-10-CM

## 2024-08-08 DIAGNOSIS — M79.661 RIGHT CALF PAIN: ICD-10-CM

## 2024-08-08 DIAGNOSIS — M79.671 RIGHT FOOT PAIN: ICD-10-CM

## 2024-08-08 PROCEDURE — 73630 X-RAY EXAM OF FOOT: CPT

## 2024-08-08 PROCEDURE — 99213 OFFICE O/P EST LOW 20 MIN: CPT | Performed by: PHYSICIAN ASSISTANT

## 2024-08-08 NOTE — PROGRESS NOTES
[facility]        NAME: Joi Ragsdale is a 44 y.o. female  : 1980    MRN: [id number]  DATE: 2024  TIME: 11:11 AM    Assessment and Plan   Right foot pain [M79.671]  1. Right foot pain  XR foot 3+ vw right      2. Right calf pain  Ambulatory referral to Physical Therapy        Will do PT and continue RICE for the great toe.       Patient Instructions     Patient Instructions   Patient Education     Toe Injury   About this topic   Your toes have many parts. You can see the skin and toenails. Other parts include bones, ligaments, tendons, and cartilage. You also have muscles, nerves, and blood vessels. You may have toe pain or other problems if any of these parts are hurt.  Common toe injuries are:  Broken bone  Sprained or torn ligament  Dislocated toe ? Toe bone is moved out of its normal position  Tendonitis, tendon injury, or muscle sprain  Bunion ? Bump at the outer edge of the bottom of the big toe  Hammertoe or mallet toe ? Toes are bent at one of the toe joints making the toe look like a claw  Toenail problems like ingrown toenail, fungus infection, bruise under the nail   Skin problems like corns, callus, blisters, rash, athlete's foot, warts  Cuts or puncture wounds  Amputation  Growth on a nerve  What are the causes?   Accident like a fall, dropping an object on the toe, stubbing the toe, cut or wound, frostbite, or burn  Problems with shoes like jamming the toe inside a shoe with a sudden stop, wearing shoes that are too tight, pressure, or friction  Illnesses like arthritis, infection, blood clots, or problems with blood supply to the foot  Other problems like repeated stress, flat feet, cutting the toenail too short, or walking in damp places with bare feet  What can make this more likely to happen?   You are more likely to have problems with your feet as you get older. Wearing poor-fitting shoes, high heels, or not wearing shoes will increase the chance of toe injury.  Certain conditions like high blood sugar or weak bones may also raise your chances of foot problems. Playing sports or doing things like dancing makes it more likely that you will have a toe injury.  What are the main signs?   There are many signs of a toe injury. You may have pain, swelling, bruising, and bleeding. Your toe may be stiff or not look normal. You may not be able to fully move your toe. Your toe may have a bump on it. Your toe may be crooked and not line up correctly. The toenail or skin may look different. You may have itching or your toe may be pale or numb.  How does the doctor diagnose this health problem?   Your doctor will do an exam. Your doctor will feel around your injured toe. Your doctor may have you move your toe up and down to check motion. Your doctor may also watch you walk. The doctor may order:  X-rays  MRI scan  How does the doctor treat this health problem?   You may need to rest your toe. Your doctor may want you to wear a brace, splint, or walking boot. You may need crutches or a walker to take the pressure off your injured toe.  Change shoe types, use inserts or extra padding.  Your doctor may treat corns or calluses with donut-shaped pads, creams, warm soaks, or a pumice stone.  Acid, freezing, or laser to remove warts  Wound care to open areas  Exercises  You may need surgery to line up bones that are out of place.  Are there other health problems to treat?   It is important to control blood sugar. This will help to avoid problems with blood flow and healing. Keep a healthy weight or lose weight. This will lower stress on the feet.  What drugs may be needed?   The doctor may order drugs or creams to:  Help with pain and swelling  Prevent or fight an infection  Treat a rash or skin problem  The doctor may give you a shot of an anti-inflammatory drug called a corticosteroid. This will help with swelling. Talk with your doctor about the risks of this shot.  What problems could  happen?   Infection  Loss of motion  Weakness  Injury to nerves, blood vessels, or other tissues  Trouble walking or with balance  Ongoing pain  Problem, like a bunion or wart, comes back  Poor healing  What can be done to prevent this health problem?   Wear comfortable, supportive shoes with a wide toe. Avoid high heels and tight shoes. If your child has a toe injury, be sure to check shoe size often.  Wear shoes when walking outdoors. Do not go barefoot.  Keep a healthy weight. Being overweight puts extra stress on your feet.  If you are a runner, run on softer surfaces such as a track instead of concrete.  Wash your feet every day. Make sure your feet are dry before putting on socks.  Always wear clean, dry socks. Change them if they get damp.  Do not go barefoot in wet areas such as swimming pools or in locker rooms. This may help you avoid getting a fungus infection.  If you have diabetes, be sure to check your feet every day. Make sure to use a mirror to check the bottoms of your feet, or have someone else check the bottoms of your feet for you. Sometimes, numbness from diabetes may prevent you from seeing a cut or problem on your foot.  Do not attempt to remove warts, calluses, or corns yourself, especially if you have diabetes. Be sure to ask your doctor what is safe to do at home.  Be careful when trimming your toenails. Cutting them too short may cause an ingrown toenail.  Last Reviewed Date   2020-10-13  Consumer Information Use and Disclaimer   This generalized information is a limited summary of diagnosis, treatment, and/or medication information. It is not meant to be comprehensive and should be used as a tool to help the user understand and/or assess potential diagnostic and treatment options. It does NOT include all information about conditions, treatments, medications, side effects, or risks that may apply to a specific patient. It is not intended to be medical advice or a substitute for the medical  advice, diagnosis, or treatment of a health care provider based on the health care provider's examination and assessment of a patient’s specific and unique circumstances. Patients must speak with a health care provider for complete information about their health, medical questions, and treatment options, including any risks or benefits regarding use of medications. This information does not endorse any treatments or medications as safe, effective, or approved for treating a specific patient. [employer] and its affiliates disclaim any warranty or liability relating to this information or the use thereof. The use of this information is governed by the Terms of Use, available at https://www.[employer]/en/know/clinical-effectiveness-terms   Copyright   Copyright © 2024 [employer] and its affiliates and/or licensors. All rights reserved.        Follow up with PCP in 3-5 days.  Proceed to  ER if symptoms worsen.    Chief Complaint     Chief Complaint   Patient presents with    Foot Injury     Pt bent right big toe under her foot last night. Pt iced it and took advil.          History of Present Illness       The patient is a 44-year-old female presenting today for pain in her right foot.  Reports yesterday while doing taekwondo she bent her right great toe back.  Did try ice and Advil as well as warm soaks which seem to help slightly.  She also reports having a tight right calf that has been giving her trouble walking.  Has been rolling it out but would like to do physical therapy.    Ankle Swelling  Associated symptoms include arthralgias and joint swelling. The symptoms are aggravated by movement and weight bearing.       Review of Systems   Review of Systems   Musculoskeletal:  Positive for arthralgias and joint swelling.   Skin:  Positive for color change.         Current Medications       Current Outpatient Medications:     albuterol (PROVENTIL HFA,VENTOLIN HFA) 90 mcg/act inhaler, Inhale 2 puffs every  "6 (six) hours as needed for wheezing or shortness of breath, Disp: 1 Inhaler, Rfl: 5    albuterol (Ventolin HFA) 90 mcg/act inhaler, Inhale 2 puffs every 6 (six) hours as needed for wheezing or shortness of breath, Disp: 18 g, Rfl: 0    fluticasone (FLONASE) 50 mcg/act nasal spray, 1 spray into each nostril daily, Disp: 9.9 mL, Rfl: 0    methylPREDNISolone 4 MG tablet therapy pack, Use as directed on package (Patient not taking: Reported on 8/1/2024), Disp: 21 tablet, Rfl: 0    valACYclovir (VALTREX) 500 mg tablet, Take 1 tablet (500 mg total) by mouth 2 (two) times a day for 3 days, Disp: 6 tablet, Rfl: 0    Current Allergies     Allergies as of 08/08/2024 - Reviewed 08/08/2024   Allergen Reaction Noted    Pollen extract Allergic Rhinitis 09/19/2019            The following portions of the patient's history were reviewed and updated as appropriate: allergies, current medications, past family history, past medical history, past social history, past surgical history and problem list.     Past Medical History:   Diagnosis Date    Asthma     FH: breast cancer in first degree relative 2/15/2018    Urinary tract infection        Past Surgical History:   Procedure Laterality Date    MULTIPLE TOOTH EXTRACTIONS         Family History   Problem Relation Age of Onset    Breast cancer Mother 73    Basal cell carcinoma Mother     Diabetes Father         Blood clot in neck, low blood pressure    No Known Problems Daughter     No Known Problems Maternal Aunt     No Known Problems Maternal Aunt     No Known Problems Maternal Aunt     No Known Problems Paternal Aunt     No Known Problems Paternal Aunt     No Known Problems Paternal Aunt     No Known Problems Paternal Aunt     No Known Problems Paternal Aunt     Diabetes Family     Hypertension Family          Medications have been verified.        Objective   /75   Pulse 70   Temp 98.4 °F (36.9 °C)   Resp 16   Ht 5' 4\" (1.626 m)   Wt 90.3 kg (199 lb)   SpO2 98%   BMI " 34.16 kg/m²        Physical Exam     Physical Exam  Vitals and nursing note reviewed.   Constitutional:       General: She is not in acute distress.     Appearance: Normal appearance. She is normal weight. She is not ill-appearing, toxic-appearing or diaphoretic.   Cardiovascular:      Rate and Rhythm: Normal rate and regular rhythm.   Pulmonary:      Effort: Pulmonary effort is normal.      Breath sounds: Normal breath sounds.   Musculoskeletal:         General: Swelling (R great toe) and tenderness (R great toe and R calf) present.   Skin:     General: Skin is warm.      Capillary Refill: Capillary refill takes less than 2 seconds.      Findings: Bruising (R great toe) present.   Neurological:      Mental Status: She is alert.                   Answers submitted by the patient for this visit:  Lower Extremity Injury Questionnaire (Submitted on 8/8/2024)  Chief Complaint: Lower extremity pain  Incident occurred: 12 to 24 hours ago  Incident location: at the gym  Injury mechanism: an inversion injury  Pain location: right foot, right toes  Pain quality: aching  Pain - numeric: 2/10  Pain course: fluctuating  tingling: No  inability to bear weight: Yes  loss of motion: Yes  loss of sensation: No  muscle weakness: No  Foreign body present: no foreign bodies

## 2024-08-08 NOTE — PATIENT INSTRUCTIONS
Patient Education     Toe Injury   About this topic   Your toes have many parts. You can see the skin and toenails. Other parts include bones, ligaments, tendons, and cartilage. You also have muscles, nerves, and blood vessels. You may have toe pain or other problems if any of these parts are hurt.  Common toe injuries are:  Broken bone  Sprained or torn ligament  Dislocated toe ? Toe bone is moved out of its normal position  Tendonitis, tendon injury, or muscle sprain  Bunion ? Bump at the outer edge of the bottom of the big toe  Hammertoe or mallet toe ? Toes are bent at one of the toe joints making the toe look like a claw  Toenail problems like ingrown toenail, fungus infection, bruise under the nail   Skin problems like corns, callus, blisters, rash, athlete's foot, warts  Cuts or puncture wounds  Amputation  Growth on a nerve  What are the causes?   Accident like a fall, dropping an object on the toe, stubbing the toe, cut or wound, frostbite, or burn  Problems with shoes like jamming the toe inside a shoe with a sudden stop, wearing shoes that are too tight, pressure, or friction  Illnesses like arthritis, infection, blood clots, or problems with blood supply to the foot  Other problems like repeated stress, flat feet, cutting the toenail too short, or walking in damp places with bare feet  What can make this more likely to happen?   You are more likely to have problems with your feet as you get older. Wearing poor-fitting shoes, high heels, or not wearing shoes will increase the chance of toe injury. Certain conditions like high blood sugar or weak bones may also raise your chances of foot problems. Playing sports or doing things like dancing makes it more likely that you will have a toe injury.  What are the main signs?   There are many signs of a toe injury. You may have pain, swelling, bruising, and bleeding. Your toe may be stiff or not look normal. You may not be able to fully move your toe. Your toe  may have a bump on it. Your toe may be crooked and not line up correctly. The toenail or skin may look different. You may have itching or your toe may be pale or numb.  How does the doctor diagnose this health problem?   Your doctor will do an exam. Your doctor will feel around your injured toe. Your doctor may have you move your toe up and down to check motion. Your doctor may also watch you walk. The doctor may order:  X-rays  MRI scan  How does the doctor treat this health problem?   You may need to rest your toe. Your doctor may want you to wear a brace, splint, or walking boot. You may need crutches or a walker to take the pressure off your injured toe.  Change shoe types, use inserts or extra padding.  Your doctor may treat corns or calluses with donut-shaped pads, creams, warm soaks, or a pumice stone.  Acid, freezing, or laser to remove warts  Wound care to open areas  Exercises  You may need surgery to line up bones that are out of place.  Are there other health problems to treat?   It is important to control blood sugar. This will help to avoid problems with blood flow and healing. Keep a healthy weight or lose weight. This will lower stress on the feet.  What drugs may be needed?   The doctor may order drugs or creams to:  Help with pain and swelling  Prevent or fight an infection  Treat a rash or skin problem  The doctor may give you a shot of an anti-inflammatory drug called a corticosteroid. This will help with swelling. Talk with your doctor about the risks of this shot.  What problems could happen?   Infection  Loss of motion  Weakness  Injury to nerves, blood vessels, or other tissues  Trouble walking or with balance  Ongoing pain  Problem, like a bunion or wart, comes back  Poor healing  What can be done to prevent this health problem?   Wear comfortable, supportive shoes with a wide toe. Avoid high heels and tight shoes. If your child has a toe injury, be sure to check shoe size often.  Wear shoes  when walking outdoors. Do not go barefoot.  Keep a healthy weight. Being overweight puts extra stress on your feet.  If you are a runner, run on softer surfaces such as a track instead of concrete.  Wash your feet every day. Make sure your feet are dry before putting on socks.  Always wear clean, dry socks. Change them if they get damp.  Do not go barefoot in wet areas such as swimming pools or in locker rooms. This may help you avoid getting a fungus infection.  If you have diabetes, be sure to check your feet every day. Make sure to use a mirror to check the bottoms of your feet, or have someone else check the bottoms of your feet for you. Sometimes, numbness from diabetes may prevent you from seeing a cut or problem on your foot.  Do not attempt to remove warts, calluses, or corns yourself, especially if you have diabetes. Be sure to ask your doctor what is safe to do at home.  Be careful when trimming your toenails. Cutting them too short may cause an ingrown toenail.  Last Reviewed Date   2020-10-13  Consumer Information Use and Disclaimer   This generalized information is a limited summary of diagnosis, treatment, and/or medication information. It is not meant to be comprehensive and should be used as a tool to help the user understand and/or assess potential diagnostic and treatment options. It does NOT include all information about conditions, treatments, medications, side effects, or risks that may apply to a specific patient. It is not intended to be medical advice or a substitute for the medical advice, diagnosis, or treatment of a health care provider based on the health care provider's examination and assessment of a patient’s specific and unique circumstances. Patients must speak with a health care provider for complete information about their health, medical questions, and treatment options, including any risks or benefits regarding use of medications. This information does not endorse any treatments  or medications as safe, effective, or approved for treating a specific patient. [employer] and its affiliates disclaim any warranty or liability relating to this information or the use thereof. The use of this information is governed by the Terms of Use, available at https://www.[employer].com/en/know/clinical-effectiveness-terms   Copyright   Copyright © 2024 [employer] and its affiliates and/or licensors. All rights reserved.

## 2024-08-15 ENCOUNTER — EVALUATION (OUTPATIENT)
Dept: PHYSICAL THERAPY | Facility: CLINIC | Age: 44
End: 2024-08-15
Payer: COMMERCIAL

## 2024-08-15 DIAGNOSIS — M79.661 RIGHT CALF PAIN: Primary | ICD-10-CM

## 2024-08-15 PROCEDURE — 97161 PT EVAL LOW COMPLEX 20 MIN: CPT

## 2024-08-15 NOTE — LETTER
August 15, 2024    Leeanna Ng PA-C  [address]   [address]  [facility] [address]    Patient: Joi Ragsdale   YOB: 1980   Date of Visit: 8/15/2024     Encounter Diagnosis     ICD-10-CM    1. Right calf pain  M79.661 Ambulatory referral to Physical Therapy          Dear Dr. Ng:    Thank you for your recent referral of Joi Ragsdale. Please review the attached evaluation summary from Joi's recent visit.     Please verify that you agree with the plan of care by signing the attached order.     If you have any questions or concerns, please do not hesitate to call.     I sincerely appreciate the opportunity to share in the care of one of your patients and hope to have another opportunity to work with you in the near future.       Sincerely,    Tarah Li, PT      Referring Provider:      I certify that I have read the below Plan of Care and certify the need for these services furnished under this plan of treatment while under my care.                    Leeanna Ng PA-C  [address]   [address] [place] [id number]  Via In Basket            PT Evaluation   Today's date: 8/15/2024  Patient name: Joi Ragsdale  : 1980  MRN: [id number]  Referring provider: Leeanna Ng PA-C  Dx:   Encounter Diagnosis     ICD-10-CM    1. Right calf pain  M79.661 Ambulatory referral to Physical Therapy            Assessment  Assessment details: Patient is a 44 y.o. Female who presents to skilled outpatient PT for referring diagnoses include right calf pain. Primary movement impairment diagnosis of strength deficits resulting in pathoanatomical symptoms of R calf and ankle pain. The aforementioned impairments have limited the patient's ability to standing and siting long periods of time. No further referral is necessary at this time based upon examination results. Patient education performed during today's session included: DN consent, nature of R calf pain and tightness,  and HEP.     Impairments: Abnormal or restricted ROM, Impaired balance, Lacks appropriate HEP, and Pain with function  Understanding of Dx/Px/POC: Excellent  Prognosis: Excellent    Patient verbalized understanding of POC. Please contact me if you have any questions or recommendations. Thank you for the referral and the opportunity to share in Joi Rasgdale's care.    Plan  Patient would benefit from: PT Eval and Skilled PT  Planned modality interventions: Dry needling, Biofeedback, Cryotherapy, TENS, Thermotherapy: Hydrocollator Packs, and Traction  Planned therapy interventions: Abdominal trunk stabilization, ADL training, Balance, Balance/WB training, Breathing training, Body mechanics training, Dry Needling, Functional ROM exercises, Gait training, HEP, Joint mobilization, Manual therapy, Gonzales taping, Neuromuscular re-education, Patient education, Postural training, Strengthening, Stretching, Therapeutic activities, Therapeutic exercises, Therapeutic training, Transfer training, Activity modification, and Work reintegration  Frequency: 2x/wk  Duration in weeks: 6  Plan of Care beginning date: 8/15/24  Plan of Care expiration date: 6 weeks - 9/26/2024  Treatment plan discussed with: Patient       Goals  Short Term Goals (4 weeks):    - Patient will be independent in basic HEP 2-3 weeks  - Patient will demonstrate ankle ROM WNL for ease with gait.   - Patient will have 0/10 pain at rest  - Patient will demonstrate >1/3 improvement in MMT grade as applicable    Long Term Goals (8 weeks):  - Patient will be independent in a comprehensive home exercise program  - Patient FOTO score will improve by 10 points.   - Patient will independently ambulate >1000 feet (community ambulation)  - Patient will self-report >75% improvement in function  - Patient functional goal: Patient will perform all taekwondo activities with no increased pain.       Subjective    History of Present Illness  - Mechanism of injury: Patient  "reports injuring her R ankle and calf pain that started 1 week ago when she was doing taekRow [place]. She had x-rays performed of her foot and ankle with no fractures noted. She has knots of her calf that she is able to work out at times, but this time, she can't. She has a \"twinge\" of the medial arch of her foot. When the injury initially happened, she had to stretch for 45 minutes prior to walking. She has been sitting at a desk primarily for work, where is works as a .     - Functional limitations: walking, standing, stair navigation      - Patient goals: \"No pain.\"         Pain  - Current pain ratin/10  - At best pain ratin/10  - At worst pain ratin/10  - Location: R foot   - Alleviating factors: rolling, stretching    Social Support  - Steps to enter house: 3  - Stairs in house: 12   - Bedroom/bathroom set up: second floor   - Lives in: multi level home   - Lives with: daughter       Objective   LE MMT  L Hip Flexion: 5/5  R Hip Flexion: 5/5   L Hip Extension: 5/5 R Hip Extension: 5/5   L Hip Abduction: 5/5 R Hip Abduction: 5/5   L Hip Adduction: 5/5 R Hip Adduction: 5/5   L Knee Extension: 5/5 R Knee Extension: 5/5   L Knee Flexion: 5/5 R Knee Flexion: 5/5   L Ankle DF: 5/5 R Ankle DF: 4/5   L Ankle PF: 5/5  R Ankle PF: 4/5   L Ankle IV: 5/5  L Ankle IV: 5/5   L Ankle EV: 5/5  L Ankle EV: 5/5     LE ROM    L ankle DF: 15 degrees R ankle DF: 5 degrees   L ankle PF: 60 degrees R ankle PF: 60 degrees   L ankle IV: 40 degrees  R ankle IV: 40 degrees    L ankle EV: 30 degrees  R ankle EV: 30 degrees    L great toe ext: 60 degrees  R great toe ext: 40 degrees        Sensation  - Light touch: intact     Ankle Comments  - HR Test    R LE: 10   L LE: 30  - SL balance assessment:    R LE: 30 seconds     L LE 60 seconds    - Static standing foot alignment: B pes planus  - Functional squat: WNL             Insurance:  AMA/CMS Eval/ Re-eval POC expires FOTO Auth #/ Referral # Total   Visits  Start date  " Expiration date Extension  Visit limitation?  PT only or  PT+OT? Co-Insurance   BC 8/15 9/26  Auth submitted on 8/15/24     BOMN  PT $0 Co-pay                                                                 AUTH #:  Date               Visits  Authed:  Used                Remaining                     Precautions: standard  Past Medical History:   Diagnosis Date   • Asthma    • FH: breast cancer in first degree relative 2/15/2018   • Urinary tract infection          Date 8/15/2024        Visit Number IE    FOTO             Manual         Ankld DF MWM         DN with estim 30'                  Neuro Re-ed         Hip burners          [facility]                            TherEx         Bike warmup         Pro stretch          Hip abd/ext standing         Squat with TB         Side stepping with loop around feet                                                                                  TherAct         Patient education 10'                                            Gait Training                                    Modalities         CP

## 2024-08-15 NOTE — PROGRESS NOTES
PT Evaluation   Today's date: 8/15/2024  Patient name: Joi Ragsdale  : 1980  MRN: [id number]  Referring provider: Leeanna Ng PA-C  Dx:   Encounter Diagnosis     ICD-10-CM    1. Right calf pain  M79.661 Ambulatory referral to Physical Therapy            Assessment  Assessment details: Patient is a 44 y.o. Female who presents to skilled outpatient PT for referring diagnoses include right calf pain. Primary movement impairment diagnosis of strength deficits resulting in pathoanatomical symptoms of R calf and ankle pain. The aforementioned impairments have limited the patient's ability to standing and siting long periods of time. No further referral is necessary at this time based upon examination results. Patient education performed during today's session included: DN consent, nature of R calf pain and tightness, and HEP.     Impairments: Abnormal or restricted ROM, Impaired balance, Lacks appropriate HEP, and Pain with function  Understanding of Dx/Px/POC: Excellent  Prognosis: Excellent    Patient verbalized understanding of POC. Please contact me if you have any questions or recommendations. Thank you for the referral and the opportunity to share in Joi Ragsdale's care.    Plan  Patient would benefit from: PT Eval and Skilled PT  Planned modality interventions: Dry needling, Biofeedback, Cryotherapy, TENS, Thermotherapy: Hydrocollator Packs, and Traction  Planned therapy interventions: Abdominal trunk stabilization, ADL training, Balance, Balance/WB training, Breathing training, Body mechanics training, Dry Needling, Functional ROM exercises, Gait training, HEP, Joint mobilization, Manual therapy, Gonzales taping, Neuromuscular re-education, Patient education, Postural training, Strengthening, Stretching, Therapeutic activities, Therapeutic exercises, Therapeutic training, Transfer training, Activity modification, and Work reintegration  Frequency: 2x/wk  Duration in weeks: 6  Plan of Care  "beginning date: 8/15/24  Plan of Care expiration date: 6 weeks - 2024  Treatment plan discussed with: Patient       Goals  Short Term Goals (4 weeks):    - Patient will be independent in basic HEP 2-3 weeks  - Patient will demonstrate ankle ROM WNL for ease with gait.   - Patient will have 0/10 pain at rest  - Patient will demonstrate >1/3 improvement in MMT grade as applicable    Long Term Goals (8 weeks):  - Patient will be independent in a comprehensive home exercise program  - Patient FOTO score will improve by 10 points.   - Patient will independently ambulate >1000 feet (community ambulation)  - Patient will self-report >75% improvement in function  - Patient functional goal: Patient will perform all taekwondo activities with no increased pain.       Subjective    History of Present Illness  - Mechanism of injury: Patient reports injuring her R ankle and calf pain that started 1 week ago when she was doing taekwando. She had x-rays performed of her foot and ankle with no fractures noted. She has knots of her calf that she is able to work out at times, but this time, she can't. She has a \"twinge\" of the medial arch of her foot. When the injury initially happened, she had to stretch for 45 minutes prior to walking. She has been sitting at a desk primarily for work, where is works as a .     - Functional limitations: walking, standing, stair navigation      - Patient goals: \"No pain.\"         Pain  - Current pain ratin/10  - At best pain ratin/10  - At worst pain ratin/10  - Location: R foot   - Alleviating factors: rolling, stretching    Social Support  - Steps to enter house: 3  - Stairs in house: 12   - Bedroom/bathroom set up: second floor   - Lives in: multi level home   - Lives with: daughter       Objective   LE MMT  L Hip Flexion: 5/5  R Hip Flexion: 5/5   L Hip Extension: 5/5 R Hip Extension: 5/5   L Hip Abduction: 5/5 R Hip Abduction: 5/5   L Hip Adduction: 5/5 R Hip Adduction: " 5/5   L Knee Extension: 5/5 R Knee Extension: 5/5   L Knee Flexion: 5/5 R Knee Flexion: 5/5   L Ankle DF: 5/5 R Ankle DF: 4/5   L Ankle PF: 5/5  R Ankle PF: 4/5   L Ankle IV: 5/5  L Ankle IV: 5/5   L Ankle EV: 5/5  L Ankle EV: 5/5     LE ROM    L ankle DF: 15 degrees R ankle DF: 5 degrees   L ankle PF: 60 degrees R ankle PF: 60 degrees   L ankle IV: 40 degrees  R ankle IV: 40 degrees    L ankle EV: 30 degrees  R ankle EV: 30 degrees    L great toe ext: 60 degrees  R great toe ext: 40 degrees        Sensation  - Light touch: intact     Ankle Comments  - HR Test    R LE: 10   L LE: 30  - SL balance assessment:    R LE: 30 seconds     L LE 60 seconds    - Static standing foot alignment: B pes planus  - Functional squat: WNL             Insurance:  AMA/CMS Eval/ Re-eval POC expires FOTO Auth #/ Referral # Total   Visits  Start date  Expiration date Extension  Visit limitation?  PT only or  PT+OT? Co-Insurance   BC 8/15 9/26  Auth submitted on 8/15/24     BOMN  PT $0 Co-pay                                                                 AUTH #:  Date               Visits  Authed:  Used                Remaining                     Precautions: standard  Past Medical History:   Diagnosis Date    Asthma     FH: breast cancer in first degree relative 2/15/2018    Urinary tract infection          Date 8/15/2024        Visit Number IE    FOTO             Manual         Ankld DF MWM         DN with estim 30'                  Neuro Re-ed         Hip burners          [facility]         Bridge                            TherEx         Bike warmup         Pro stretch          Hip abd/ext standing         Squat with TB         Side stepping with loop around feet                                                                                  TherAct         Patient education 10'                                            Gait Training                                    Modalities         CP

## 2024-08-21 ENCOUNTER — OFFICE VISIT (OUTPATIENT)
Dept: PHYSICAL THERAPY | Facility: CLINIC | Age: 44
End: 2024-08-21
Payer: COMMERCIAL

## 2024-08-21 DIAGNOSIS — M79.661 RIGHT CALF PAIN: Primary | ICD-10-CM

## 2024-08-21 PROCEDURE — 97530 THERAPEUTIC ACTIVITIES: CPT

## 2024-08-21 PROCEDURE — 97140 MANUAL THERAPY 1/> REGIONS: CPT

## 2024-08-21 NOTE — PROGRESS NOTES
Daily Note     Today's date: 2024  Patient name: Joi Ragsdale  : 1980  MRN: [id number]  Referring provider: Leeanna Ng PA-C  Dx:   Encounter Diagnosis     ICD-10-CM    1. Right calf pain  M79.661           Start Time: 0800  Stop Time: 0845  Total time in clinic (min): 45 minutes    Subjective: Patient notes significant improvements in her R calf stiffness and ankle pain since she was needled last visit.       Objective: See treatment diary below      Assessment: Tolerated treatment well. Dry needling consent for reviewed and signed by patient. Pt educated on dry needling to include but not limited to: risks/benefits/aftercare instructions. Provided with educational handout on DN. All questions and concerns answered and addressed.  Pt verbally consented to dry needling treatment session. Risks/benefits/aftercare instructions reviewed. All questions/concerns addressed.  Pt in Long sitting position. Hand hygiene performed pre and post DN treatment session. Placement of needles in pathological tissue.   R anterior tib, R gastroc, R medial malleolus, R great toe, R plantar surface of her foot (needle location).  Total treatment duration to include set up/break down/in situ: 40 minutes. Patient was supervised by clinician throughout entirety of treatment session to include when DN in situ; clinician next to patient. All needles counted upon insertion and removal-- 25 needles. All accounted for and disposed in appropriate sharp container.     No adverse reaction to treatment. Pt advised may experience bruising, soreness, fatigue. Pt verbalized understanding.      Patient would benefit from continued PT      Plan: Continue per plan of care.      Insurance:  AMA/CMS Eval/ Re-eval POC expires FOTO Auth #/ Referral # Total   Visits  Start date  Expiration date Extension  Visit limitation?  PT only or  PT+OT? Co-Insurance   BC 8/15 9/26  Auth submitted on 8/15/24     BOMN  PT $0 Co-pay                                                                  AUTH #:  Date               Visits  Authed:  Used                Remaining                     Precautions: standard  Past Medical History:   Diagnosis Date    Asthma     FH: breast cancer in first degree relative 2/15/2018    Urinary tract infection          Date 8/15/2024 8/21/24       Visit Number IE 2   FOTO             Manual         Ankld DF MWM         DN with estim 30'  30'                Neuro Re-ed         Hip burners          [facility]         Bridge                            TherEx         Bike warmup         Pro stretch          Hip abd/ext standing         Squat with TB         Side stepping with loop around feet                                                                                  TherAct         Patient education 10' 10'                                           Gait Training                                    Modalities         CP

## 2024-08-28 ENCOUNTER — APPOINTMENT (OUTPATIENT)
Dept: PHYSICAL THERAPY | Facility: CLINIC | Age: 44
End: 2024-08-28
Payer: COMMERCIAL

## 2024-08-29 ENCOUNTER — OFFICE VISIT (OUTPATIENT)
Dept: PHYSICAL THERAPY | Facility: CLINIC | Age: 44
End: 2024-08-29
Payer: COMMERCIAL

## 2024-08-29 DIAGNOSIS — M79.661 RIGHT CALF PAIN: Primary | ICD-10-CM

## 2024-08-29 PROCEDURE — 97140 MANUAL THERAPY 1/> REGIONS: CPT

## 2024-08-29 PROCEDURE — 97530 THERAPEUTIC ACTIVITIES: CPT

## 2024-08-29 NOTE — PROGRESS NOTES
Daily Note     Today's date: 2024  Patient name: Joi Ragsdale  : 1980  MRN: [id number]  Referring provider: Leeanna Ng PA-C  Dx:   Encounter Diagnosis     ICD-10-CM    1. Right calf pain  M79.661           Start Time: 08  Stop Time: 925  Total time in clinic (min): 45 minutes    Subjective: Patient feels like her ankle is stuck. She has minimal pain on the medial aspect today. She wakes up with stiffness.       Objective: See treatment diary below      Assessment: Tolerated treatment well. Dry needling consent for reviewed and signed by patient. Pt educated on dry needling to include but not limited to: risks/benefits/aftercare instructions. Provided with educational handout on DN. All questions and concerns answered and addressed.  Pt verbally consented to dry needling treatment session. Risks/benefits/aftercare instructions reviewed. All questions/concerns addressed.  Pt in prone position. Hand hygiene performed pre and post DN treatment session. Placement of needles in pathological tissue.   R anterior tib, R gastroc, R medial malleolus, R great toe, R plantar surface of her foot (needle location).  Total treatment duration to include set up/break down/in situ: 40 minutes. Patient was supervised by clinician throughout entirety of treatment session to include when DN in situ; clinician next to patient. All needles counted upon insertion and removal-- 15 needles. All accounted for and disposed in appropriate sharp container.     No adverse reaction to treatment. Pt advised may experience bruising, soreness, fatigue. Pt verbalized understanding.      Patient would benefit from continued PT      Plan: Continue per plan of care.      Insurance:  AMA/CMS Eval/ Re-eval POC expires FOTO Auth #/ Referral # Total   Visits  Start date  Expiration date Extension  Visit limitation?  PT only or  PT+OT? Co-Insurance   BC 8/15 9/26  Auth submitted on 8/15/24     BOMN  PT $0 Co-pay                                                                  AUTH #:  Date               Visits  Authed:  Used                Remaining                     Precautions: standard  Past Medical History:   Diagnosis Date    Asthma     FH: breast cancer in first degree relative 2/15/2018    Urinary tract infection          Date 8/15/2024 8/21/24 8/29/24      Visit Number IE 2 3  FOTO             Manual         Ankld DF MWM         DN with estim 30'  30' 30'         TC distraction HVLA on R performed with patient consent       Neuro Re-ed         Hip burners          [employer]         Bridge                            TherEx         Bike warmup         Pro stretch          Hip abd/ext standing         Squat with TB         Side stepping with loop around feet                                                                                  TherAct         Patient education 10' 10' 10'                                          Gait Training                                    Modalities         CP

## 2024-10-10 ENCOUNTER — HOSPITAL ENCOUNTER (OUTPATIENT)
Dept: RADIOLOGY | Facility: HOSPITAL | Age: 44
Discharge: HOME | End: 2024-10-10
Payer: COMMERCIAL

## 2024-10-10 DIAGNOSIS — R92.8 ABNORMAL MAMMOGRAM: ICD-10-CM

## 2024-10-10 PROCEDURE — 77066 DX MAMMO INCL CAD BI: CPT

## 2024-10-10 PROCEDURE — G0279 TOMOSYNTHESIS, MAMMO: HCPCS

## 2024-11-27 NOTE — ASSESSMENT & PLAN NOTE
- Patient requested refill to have supply for future outbreaks  - Advised to use Lysine 500 mg daily  - No new outbreaks TELEMETRY

## 2025-03-25 ENCOUNTER — OFFICE VISIT (OUTPATIENT)
Dept: FAMILY MEDICINE CLINIC | Facility: CLINIC | Age: 45
End: 2025-03-25
Payer: COMMERCIAL

## 2025-03-25 VITALS
HEIGHT: 64 IN | SYSTOLIC BLOOD PRESSURE: 116 MMHG | WEIGHT: 206 LBS | DIASTOLIC BLOOD PRESSURE: 70 MMHG | TEMPERATURE: 99 F | BODY MASS INDEX: 35.17 KG/M2 | RESPIRATION RATE: 16 BRPM | HEART RATE: 68 BPM

## 2025-03-25 DIAGNOSIS — A60.04 HERPES SIMPLEX VULVOVAGINITIS: ICD-10-CM

## 2025-03-25 DIAGNOSIS — J45.20 MILD INTERMITTENT ASTHMA WITHOUT COMPLICATION: Primary | ICD-10-CM

## 2025-03-25 DIAGNOSIS — Z13.0 SCREENING FOR DEFICIENCY ANEMIA: ICD-10-CM

## 2025-03-25 DIAGNOSIS — Z13.6 SCREENING FOR CARDIOVASCULAR CONDITION: ICD-10-CM

## 2025-03-25 DIAGNOSIS — Z12.11 SCREENING FOR COLON CANCER: ICD-10-CM

## 2025-03-25 PROBLEM — Z12.31 ENCOUNTER FOR SCREENING MAMMOGRAM FOR BREAST CANCER: Status: RESOLVED | Noted: 2018-02-15 | Resolved: 2025-03-25

## 2025-03-25 PROCEDURE — 99214 OFFICE O/P EST MOD 30 MIN: CPT | Performed by: FAMILY MEDICINE

## 2025-03-25 RX ORDER — VALACYCLOVIR HYDROCHLORIDE 500 MG/1
500 TABLET, FILM COATED ORAL 2 TIMES DAILY
Qty: 6 TABLET | Refills: 0 | Status: SHIPPED | OUTPATIENT
Start: 2025-03-25 | End: 2025-03-28

## 2025-03-25 RX ORDER — ALBUTEROL SULFATE 90 UG/1
2 INHALANT RESPIRATORY (INHALATION) EVERY 6 HOURS PRN
Qty: 8 G | Refills: 1 | Status: SHIPPED | OUTPATIENT
Start: 2025-03-25

## 2025-03-25 NOTE — ASSESSMENT & PLAN NOTE
Stable  Continue as needed use of albuterol  Orders:  •  albuterol (PROVENTIL HFA,VENTOLIN HFA) 90 mcg/act inhaler; Inhale 2 puffs every 6 (six) hours as needed for wheezing or shortness of breath

## 2025-03-25 NOTE — ASSESSMENT & PLAN NOTE
Stable  Continue valacyclovir use as needed for outbreaks  Orders:  •  valACYclovir (VALTREX) 500 mg tablet; Take 1 tablet (500 mg total) by mouth 2 (two) times a day for 3 days

## 2025-03-25 NOTE — PROGRESS NOTES
"Name: Joi Ragsdale      : 1980      MRN: [id number]  Encounter Provider: Akila Dowell DO  Encounter Date: 3/25/2025   Encounter department: [facility]  :  Assessment & Plan  Mild intermittent asthma without complication  Stable  Continue as needed use of albuterol  Orders:  •  albuterol (PROVENTIL HFA,VENTOLIN HFA) 90 mcg/act inhaler; Inhale 2 puffs every 6 (six) hours as needed for wheezing or shortness of breath    Screening for colon cancer    Orders:  •  Ambulatory referral to Gastroenterology; Future    Herpes simplex vulvovaginitis  Stable  Continue valacyclovir use as needed for outbreaks  Orders:  •  valACYclovir (VALTREX) 500 mg tablet; Take 1 tablet (500 mg total) by mouth 2 (two) times a day for 3 days    Screening for deficiency anemia    Orders:  •  CBC; Future    Screening for cardiovascular condition    Orders:  •  Comprehensive metabolic panel; Future  •  Lipid Panel with Direct LDL reflex; Future    Return in about 6 months (around 2025) for Annual physical.       History of Present Illness   She is here to establish for primary care. She would like a doctor that she sees regularly.     She would like a refill on her albuterol inhaler.  She only uses it rarely when her asthma is exacerbated by cleaning products.  Her last inhaler lasted her over 2 years.    She would also like a refill on her valacyclovir.  She uses it for rare outbreaks of her herpes.      Review of Systems    Objective   /70   Pulse 68   Temp 99 °F (37.2 °C)   Resp 16   Ht 5' 4\" (1.626 m)   Wt 93.4 kg (206 lb)   BMI 35.36 kg/m²      Physical Exam  Vitals and nursing note reviewed.   Constitutional:       General: She is not in acute distress.     Appearance: She is well-developed.   HENT:      Head: Normocephalic and atraumatic.      Right Ear: Tympanic membrane normal.      Left Ear: Tympanic membrane normal.   Cardiovascular:      Rate and Rhythm: Normal rate and regular rhythm.      " Heart sounds: No murmur heard.  Pulmonary:      Effort: Pulmonary effort is normal. No respiratory distress.      Breath sounds: Normal breath sounds.   Musculoskeletal:      Cervical back: Neck supple.   Neurological:      Mental Status: She is alert.

## 2025-06-09 ENCOUNTER — TELEPHONE (OUTPATIENT)
Age: 45
End: 2025-06-09

## 2025-06-09 ENCOUNTER — PREP FOR PROCEDURE (OUTPATIENT)
Age: 45
End: 2025-06-09

## 2025-06-09 DIAGNOSIS — Z12.11 SCREENING FOR COLON CANCER: Primary | ICD-10-CM

## 2025-06-09 NOTE — TELEPHONE ENCOUNTER
06/09/25  Screened by: Joi Francois MA    Referring Provider Dr. Dowell    Pre- Screening:     There is no height or weight on file to calculate BMI. 35.36  Has patient been referred for a routine screening Colonoscopy? yes  Is the patient between 45-75 years old? yes      Previous Colonoscopy no   If yes:    Date:     Facility:     Reason:         Does the patient want to see a Gastroenterologist prior to their procedure OR are they having any GI symptoms? no    Has the patient been hospitalized or had abdominal surgery in the past 6 months? no    Does the patient use supplemental oxygen? no    Does the patient take Coumadin, Lovenox, Plavix, Elliquis, Xarelto, or other blood thinning medication? no    Has the patient had a stroke, cardiac event, or stent placed in the past year? no      If patient is between 45yrs - 49yrs, please advise patient that we will have to confirm benefits & coverage with their insurance company for a routine screening colonoscopy.

## 2025-06-09 NOTE — LETTER
Adeline Tamez,    Attached are your prep instructions for your upcoming Colonoscopy scheduled on 7/21/25 with Dr. Mayer. The GI lab will be calling the day before between 2pm and 6pm with your arrival time for your procedure. If you have any questions, please feel free to contact our office at [phone].    Address to our location:  [facility], [address] [place] [address], [id number]     Thank you for choosing [facility]!

## 2025-06-09 NOTE — TELEPHONE ENCOUNTER
Scheduled date of colonoscopy (as of today): 7/21/25    Physician performing colonoscopy: Dr. Mayer    Location of colonoscopy: [facility]    Bowel prep reviewed with patient: Jarad/Barbara    Instructions reviewed with patient by: blaze

## 2025-06-13 ENCOUNTER — OFFICE VISIT (OUTPATIENT)
Dept: FAMILY MEDICINE CLINIC | Facility: CLINIC | Age: 45
End: 2025-06-13
Payer: COMMERCIAL

## 2025-06-13 VITALS
BODY MASS INDEX: 32.55 KG/M2 | HEART RATE: 86 BPM | WEIGHT: 207.4 LBS | HEIGHT: 67 IN | SYSTOLIC BLOOD PRESSURE: 102 MMHG | DIASTOLIC BLOOD PRESSURE: 60 MMHG | RESPIRATION RATE: 16 BRPM | TEMPERATURE: 99.4 F | OXYGEN SATURATION: 95 %

## 2025-06-13 DIAGNOSIS — N76.0 ACUTE VAGINITIS: Primary | ICD-10-CM

## 2025-06-13 PROCEDURE — 99213 OFFICE O/P EST LOW 20 MIN: CPT | Performed by: NURSE PRACTITIONER

## 2025-06-13 NOTE — PROGRESS NOTES
Name: Joi Ragsdale      : 1980      MRN: [id number]  Encounter Provider: BREONNA Sampson  Encounter Date: 2025   Encounter department: [facility]  :  Assessment & Plan  Acute vaginitis  Will f/u with results of culture    Orders:    NuSwab Vaginitis Plus (VG+)           History of Present Illness   Here today with concern for vaginal itching and discharge.  Started as external itching after intercourse, followed by spotting.  Then had a watery, copious discharge.   Spotting and light bleeding since.   Menses   Does boric acid suppositories after intercourse  Has not used anything OTC to treat     Vaginal Discharge  The patient's primary symptoms include genital itching, a genital odor and vaginal discharge. The patient's pertinent negatives include no genital lesions, genital rash, missed menses, pelvic pain or vaginal bleeding. Pertinent negatives include no abdominal pain, anorexia, back pain, chills, constipation, diarrhea, discolored urine, dysuria, fever, flank pain, frequency, headaches, hematuria, joint pain, joint swelling, nausea, painful intercourse, rash, sore throat, urgency or vomiting.   Female  Problem  The patient's primary symptoms include genital itching, a genital odor and vaginal discharge. The patient's pertinent negatives include no genital lesions, genital rash, missed menses, pelvic pain or vaginal bleeding. This is a new problem. The current episode started 1 to 4 weeks ago. The problem occurs 2 to 4 times per day. The problem has been waxing and waning. The patient is experiencing no pain. The problem affects both sides. She is not pregnant. Pertinent negatives include no abdominal pain, anorexia, back pain, chills, constipation, diarrhea, discolored urine, dysuria, fever, flank pain, frequency, headaches, hematuria, joint pain, joint swelling, nausea, painful intercourse, rash, sore throat, urgency or vomiting. The vaginal discharge was brown,  "malodorous and thin. She has not been passing clots. She has not been passing tissue. Nothing aggravates the symptoms. She is sexually active. It is possible that her partner has an STD. She uses nothing for contraception. Her menstrual history has been regular.     Review of Systems   Constitutional:  Negative for chills and fever.   HENT:  Negative for sore throat.    Gastrointestinal:  Negative for abdominal pain, anorexia, constipation, diarrhea, nausea and vomiting.   Genitourinary:  Positive for vaginal discharge. Negative for dysuria, flank pain, frequency, hematuria, missed menses, pelvic pain and urgency.   Musculoskeletal:  Negative for back pain and joint pain.   Skin:  Negative for rash.   Neurological:  Negative for headaches.       Objective   /60   Pulse 86   Temp 99.4 °F (37.4 °C)   Resp 16   Ht 5' 7\" (1.702 m)   Wt 94.1 kg (207 lb 6.4 oz)   LMP 05/31/2025 (Exact Date)   SpO2 95%   BMI 32.48 kg/m²      Physical Exam  Vitals and nursing note reviewed.   Constitutional:       General: She is not in acute distress.     Appearance: Normal appearance.   HENT:      Head: Normocephalic and atraumatic.     Eyes:      Conjunctiva/sclera: Conjunctivae normal.     Neck:      Vascular: No carotid bruit.     Cardiovascular:      Rate and Rhythm: Normal rate and regular rhythm.      Pulses: Normal pulses.      Heart sounds: Normal heart sounds. No murmur heard.  Pulmonary:      Effort: Pulmonary effort is normal.      Breath sounds: Normal breath sounds.   Genitourinary:     Labia:         Right: No rash or lesion.         Left: No rash or lesion.       Vagina: Vaginal discharge present.      Cervix: Normal.     Skin:     General: Skin is warm and dry.     Neurological:      Mental Status: She is alert.     Psychiatric:         Mood and Affect: Mood normal.         Behavior: Behavior normal.         "

## 2025-06-16 ENCOUNTER — RESULTS FOLLOW-UP (OUTPATIENT)
Dept: FAMILY MEDICINE CLINIC | Facility: CLINIC | Age: 45
End: 2025-06-16

## 2025-06-16 DIAGNOSIS — A59.01 TRICHOMONAL VAGINITIS: Primary | ICD-10-CM

## 2025-06-16 LAB
A VAGINAE DNA VAG QL NAA+PROBE: ABNORMAL SCORE
BVAB2 DNA VAG QL NAA+PROBE: ABNORMAL SCORE
C ALBICANS DNA VAG QL NAA+PROBE: NEGATIVE
C GLABRATA DNA VAG QL NAA+PROBE: NEGATIVE
C TRACH RRNA SPEC QL NAA+PROBE: NEGATIVE
MEGA1 DNA VAG QL NAA+PROBE: ABNORMAL SCORE
N GONORRHOEA RRNA SPEC QL NAA+PROBE: NEGATIVE
T VAGINALIS RRNA SPEC QL NAA+PROBE: POSITIVE

## 2025-06-16 RX ORDER — METRONIDAZOLE 500 MG/1
500 TABLET ORAL EVERY 12 HOURS SCHEDULED
Qty: 14 TABLET | Refills: 0 | Status: SHIPPED | OUTPATIENT
Start: 2025-06-16 | End: 2025-06-23

## 2025-07-08 ENCOUNTER — ANESTHESIA EVENT (OUTPATIENT)
Dept: ANESTHESIOLOGY | Facility: HOSPITAL | Age: 45
End: 2025-07-08

## 2025-07-08 ENCOUNTER — ANESTHESIA (OUTPATIENT)
Dept: ANESTHESIOLOGY | Facility: HOSPITAL | Age: 45
End: 2025-07-08

## 2025-07-20 RX ORDER — LIDOCAINE HYDROCHLORIDE 10 MG/ML
0.5 INJECTION, SOLUTION EPIDURAL; INFILTRATION; INTRACAUDAL; PERINEURAL ONCE AS NEEDED
Status: CANCELLED | OUTPATIENT
Start: 2025-07-20

## 2025-07-20 RX ORDER — SODIUM CHLORIDE, SODIUM LACTATE, POTASSIUM CHLORIDE, CALCIUM CHLORIDE 600; 310; 30; 20 MG/100ML; MG/100ML; MG/100ML; MG/100ML
20 INJECTION, SOLUTION INTRAVENOUS CONTINUOUS
Status: CANCELLED | OUTPATIENT
Start: 2025-07-20

## 2025-07-21 ENCOUNTER — ANESTHESIA EVENT (OUTPATIENT)
Dept: GASTROENTEROLOGY | Facility: AMBULARY SURGERY CENTER | Age: 45
End: 2025-07-21
Payer: COMMERCIAL

## 2025-07-21 ENCOUNTER — HOSPITAL ENCOUNTER (OUTPATIENT)
Dept: GASTROENTEROLOGY | Facility: AMBULARY SURGERY CENTER | Age: 45
Setting detail: OUTPATIENT SURGERY
Discharge: HOME | End: 2025-07-21
Attending: INTERNAL MEDICINE
Payer: COMMERCIAL

## 2025-07-21 VITALS
OXYGEN SATURATION: 99 % | HEART RATE: 66 BPM | WEIGHT: 207 LBS | HEIGHT: 64 IN | SYSTOLIC BLOOD PRESSURE: 132 MMHG | RESPIRATION RATE: 18 BRPM | BODY MASS INDEX: 35.34 KG/M2 | DIASTOLIC BLOOD PRESSURE: 67 MMHG | TEMPERATURE: 97.6 F

## 2025-07-21 DIAGNOSIS — Z12.11 SCREENING FOR COLON CANCER: ICD-10-CM

## 2025-07-21 PROBLEM — E66.812 OBESITY, CLASS II, BMI 35-39.9: Status: ACTIVE | Noted: 2025-07-21

## 2025-07-21 LAB
EXT PREGNANCY TEST URINE: NEGATIVE
EXT. CONTROL: NORMAL

## 2025-07-21 PROCEDURE — 81025 URINE PREGNANCY TEST: CPT | Performed by: ANESTHESIOLOGY

## 2025-07-21 PROCEDURE — 88305 TISSUE EXAM BY PATHOLOGIST: CPT | Performed by: PATHOLOGY

## 2025-07-21 RX ORDER — LIDOCAINE HYDROCHLORIDE 10 MG/ML
INJECTION, SOLUTION EPIDURAL; INFILTRATION; INTRACAUDAL; PERINEURAL AS NEEDED
Status: DISCONTINUED | OUTPATIENT
Start: 2025-07-21 | End: 2025-07-21

## 2025-07-21 RX ORDER — PROPOFOL 10 MG/ML
INJECTION, EMULSION INTRAVENOUS AS NEEDED
Status: DISCONTINUED | OUTPATIENT
Start: 2025-07-21 | End: 2025-07-21

## 2025-07-21 RX ORDER — PROPOFOL 10 MG/ML
INJECTION, EMULSION INTRAVENOUS CONTINUOUS PRN
Status: DISCONTINUED | OUTPATIENT
Start: 2025-07-21 | End: 2025-07-21

## 2025-07-21 RX ORDER — LIDOCAINE HYDROCHLORIDE 10 MG/ML
0.5 INJECTION, SOLUTION EPIDURAL; INFILTRATION; INTRACAUDAL; PERINEURAL ONCE AS NEEDED
Status: DISCONTINUED | OUTPATIENT
Start: 2025-07-21 | End: 2025-07-25 | Stop reason: HOSPADM

## 2025-07-21 RX ORDER — SODIUM CHLORIDE, SODIUM LACTATE, POTASSIUM CHLORIDE, CALCIUM CHLORIDE 600; 310; 30; 20 MG/100ML; MG/100ML; MG/100ML; MG/100ML
20 INJECTION, SOLUTION INTRAVENOUS CONTINUOUS
Status: DISCONTINUED | OUTPATIENT
Start: 2025-07-21 | End: 2025-07-25 | Stop reason: HOSPADM

## 2025-07-21 RX ADMIN — LIDOCAINE HYDROCHLORIDE 50 MG: 10 INJECTION, SOLUTION EPIDURAL; INFILTRATION; INTRACAUDAL; PERINEURAL at 09:52

## 2025-07-21 RX ADMIN — PROPOFOL 140 MCG/KG/MIN: 10 INJECTION, EMULSION INTRAVENOUS at 09:53

## 2025-07-21 RX ADMIN — PROPOFOL 50 MG: 10 INJECTION, EMULSION INTRAVENOUS at 09:53

## 2025-07-21 RX ADMIN — PROPOFOL 100 MG: 10 INJECTION, EMULSION INTRAVENOUS at 09:52

## 2025-07-21 RX ADMIN — SODIUM CHLORIDE, SODIUM LACTATE, POTASSIUM CHLORIDE, AND CALCIUM CHLORIDE 20 ML/HR: .6; .31; .03; .02 INJECTION, SOLUTION INTRAVENOUS at 09:20

## 2025-07-21 RX ADMIN — PROPOFOL 50 MG: 10 INJECTION, EMULSION INTRAVENOUS at 09:57

## 2025-07-21 NOTE — ANESTHESIA POSTPROCEDURE EVALUATION
Post-Op Assessment Note    CV Status:  Stable  Pain Score: 0    Pain management: adequate       Mental Status:  Awake   Hydration Status:  Stable   PONV Controlled:  None     Post Op Vitals Reviewed: Yes    No anethesia notable event occurred.    Staff: Anesthesiologist, CRNA           Last Filed PACU Vitals:  Vitals Value Taken Time   Temp     Pulse 81 07/21/25 10:20   /64 07/21/25 10:20   Resp 18 07/21/25 10:20   SpO2 98 % 07/21/25 10:20

## 2025-07-21 NOTE — ANESTHESIA PREPROCEDURE EVALUATION
Procedure:  COLONOSCOPY    Relevant Problems   PULMONARY   (+) Mild intermittent asthma without complication      Other   (+) Obesity, Class II, BMI 35-39.9      Denies recent fever, cough or other symptom of upper respiratory tract infection.    Confirmed NPO appropriate    Physical Exam    Airway     Mallampati score: III  TM Distance: >3 FB  Neck ROM: full  Mouth opening: >= 4 cm      Cardiovascular      Dental   No notable dental hx     Pulmonary      Neurological    She appears awake, alert and oriented x3.      Other Findings  post-pubertal.      Anesthesia Plan  ASA Score- 2     Anesthesia Type- IV sedation with anesthesia with ASA Monitors.         Additional Monitors:     Airway Plan: natural airway.           Plan Factors-Exercise tolerance (METS): >4 METS.Exercise comment: Able to climb two flights of stairs without cardiopulmonary limitation.    Chart reviewed.        Patient is not a current smoker.  Patient did not smoke on day of surgery.            Induction- intravenous.    Postoperative Plan- .   Monitoring Plan - Monitoring plan - standard ASA monitoring  Post Operative Pain Plan - non-opiod analgesics        Informed Consent- Anesthetic plan and risks discussed with patient.        NPO Status:  Vitals Value Taken Time   Date of last liquid 07/21/25 07/21/25 09:00   Time of last liquid 0600 07/21/25 09:00   Date of last solid 07/20/25 07/21/25 09:00   Time of last solid 0800 07/21/25 09:00

## 2025-07-21 NOTE — H&P
"History and Physical -  [facility]  Joi Ragsdale 45 y.o. female MRN: [id number]                  HPI: Joi Ragsdale is a 45 y.o. year old female who presents for open access screening colonoscopy      REVIEW OF SYSTEMS: Per the HPI, and otherwise unremarkable.    Historical Information   Past Medical History[1]  Past Surgical History[2]  Social History   Social History     Substance and Sexual Activity   Alcohol Use Yes    Comment: rarely     Social History     Substance and Sexual Activity   Drug Use Never     Tobacco Use History[3]  Family History[4]    Meds/Allergies     Current Medications[5]    Allergies[6]    Objective     /82   Pulse 86   Temp 97.6 °F (36.4 °C) (Temporal)   Resp 17   Ht 5' 4\" (1.626 m)   Wt 93.9 kg (207 lb)   LMP 06/30/2025   SpO2 94%   BMI 35.53 kg/m²       PHYSICAL EXAM    Gen: NAD  Head: NCAT  CV: RRR  CHEST: Clear  ABD: soft, NT/ND  EXT: no edema      ASSESSMENT/PLAN:  This is a 45 y.o. year old female here for colonoscopy, and she is stable and optimized for her procedure.             [1]   Past Medical History:  Diagnosis Date    Asthma     FH: breast cancer in first degree relative 2/15/2018    Urinary tract infection    [2]   Past Surgical History:  Procedure Laterality Date    MULTIPLE TOOTH EXTRACTIONS     [3]   Social History  Tobacco Use   Smoking Status Never   Smokeless Tobacco Never   [4]   Family History  Problem Relation Name Age of Onset    Breast cancer Mother  73    Basal cell carcinoma Mother      Diabetes Father          Blood clot in neck, low blood pressure    No Known Problems Daughter      No Known Problems Maternal Aunt      No Known Problems Maternal Aunt      No Known Problems Maternal Aunt      No Known Problems Paternal Aunt      No Known Problems Paternal Aunt      No Known Problems Paternal Aunt      No Known Problems Paternal Aunt      No Known Problems Paternal Aunt      Diabetes Family      Hypertension Family     [5] " rle inj x 2 weeks , worsening pain Ambulation Covid pos March Denies fever cough or SOB   Current Outpatient Medications:     fluticasone (FLONASE) 50 mcg/act nasal spray    albuterol (PROVENTIL HFA,VENTOLIN HFA) 90 mcg/act inhaler    valACYclovir (VALTREX) 500 mg tablet    Current Facility-Administered Medications:     lactated ringers infusion, 20 mL/hr, Intravenous, Continuous, 20 mL/hr at 07/21/25 0920    lidocaine (PF) (XYLOCAINE-MPF) 1 % injection 0.5 mL, 0.5 mL, Infiltration, Once PRN  [6]   Allergies  Allergen Reactions    Dust Mite Extract Other (See Comments)     Asthmatic rxn    Pollen Extract Allergic Rhinitis

## 2025-07-21 NOTE — PERIOPERATIVE NURSING NOTE
Patient brought from procedure to phase two. Sbar given at bedside to  Erma GARCIA rn  . Patient resting in bed, bed locked in lowest position with side rails raise, call light in reach and environment cleared. Plan of care ongoing.

## 2025-07-28 PROCEDURE — 88305 TISSUE EXAM BY PATHOLOGIST: CPT | Performed by: PATHOLOGY
